# Patient Record
Sex: MALE | Race: WHITE | NOT HISPANIC OR LATINO | Employment: FULL TIME | URBAN - METROPOLITAN AREA
[De-identification: names, ages, dates, MRNs, and addresses within clinical notes are randomized per-mention and may not be internally consistent; named-entity substitution may affect disease eponyms.]

---

## 2019-12-13 DIAGNOSIS — M25.552 LEFT HIP PAIN: Primary | ICD-10-CM

## 2019-12-19 ENCOUNTER — TELEPHONE (OUTPATIENT)
Dept: ORTHOPEDICS | Facility: CLINIC | Age: 34
End: 2019-12-19

## 2019-12-19 NOTE — TELEPHONE ENCOUNTER
----- Message from Denys Storm sent at 12/19/2019  9:42 AM CST -----  Contact: [phone] / self  Patient would like a call back from a nurse for clarity on an appointment scheduled for tomorrow. Please Advise.

## 2019-12-19 NOTE — TELEPHONE ENCOUNTER
Returned call to pt.  Pt was unaware that this appointment had been scheduled.  Pt decided to cxl appointment w/ orthopedics at this time.  Pt will call back at a later time to reschedule orthopedic appointment

## 2020-01-08 ENCOUNTER — CLINICAL SUPPORT (OUTPATIENT)
Dept: REHABILITATION | Facility: OTHER | Age: 35
End: 2020-01-08
Payer: COMMERCIAL

## 2020-01-08 DIAGNOSIS — R29.898 WEAKNESS OF LEFT HIP: ICD-10-CM

## 2020-01-08 DIAGNOSIS — R29.3 POOR POSTURE: ICD-10-CM

## 2020-01-08 DIAGNOSIS — G89.29 CHRONIC LEFT HIP PAIN: ICD-10-CM

## 2020-01-08 DIAGNOSIS — M25.552 CHRONIC LEFT HIP PAIN: ICD-10-CM

## 2020-01-08 PROCEDURE — 97161 PT EVAL LOW COMPLEX 20 MIN: CPT | Mod: PN

## 2020-01-08 PROCEDURE — 97110 THERAPEUTIC EXERCISES: CPT | Mod: PN

## 2020-01-08 NOTE — PATIENT INSTRUCTIONS
PIRIFORMIS STRETCH        While lying on your back with both knee bent, cross your affected leg on the other knee.     Next, hold your unaffected thigh and pull it up towards your chest until a stretch is felt in the buttock.    Hold for 30 seconds. Perform 3 reps each LE, 1-2 times a day.    Copyright © 2285-6491 [employer]    HIP FLEXOR / QUAD STRETCH WITH STRAP - ROCKY STRETCH        Place a strap or belt around your foot as shown. Bring the other end of the belt around your shoulder. If using a belt, you may need to link 2 belts together for extra length.     While lying on a table or high bed, let the affected leg lower towards the floor. Next, gently pull on the strap to get your knee to bend until you feel a stretch on top of your thigh.      Hold for 30 seconds. Perform 3 reps each LE, 1-2 times a day.    Copyright © 2207-4802 [employer]    ILIOTIBIAL BAND STRETCH WITH BELT - ITB          Loop a belt around your foot. While lying on your back and leg up in front of you and knee straight, bring your leg across midline for a gentle stretch felt along your outer thigh.    Hold for 30 seconds. Perform 3 reps each LE, 1-2 times a day.    Copyright © 1941-9992 [employer]    HIP ABDUCTION - SIDELYING          While lying on your side, slowly raise up your top leg to the side. Keep your knee straight and maintain your toes pointed forward the entire time. Keep your leg in-line with your body.    The bottom leg can be bent to stabilize your body.    Hold for 30 seconds. Perform 2 sets of 10 reps, 1-2 times a day.    Copyright © 1005-3710 [employer]    SI joint Muscle energy for L Posterior/R anterior rotation              Bring both knees up towards your chest as shown in the picture.  Place your Left hand on top of your Left thigh, and your Right hand on the back of your Right thigh.  Push your legs into each hand with about 50% effort.      Perform 3 reps and hold for 3 seconds. Perform twice a  day.    Copyright © 4653-4164 [employer]

## 2020-01-09 NOTE — PLAN OF CARE
[facility]  Physical Therapy Initial Evaluation    Name: Nils Patino  Clinic Number: [id number]    Therapy Diagnosis:   Encounter Diagnoses   Name Primary?    Poor posture     Chronic left hip pain     Weakness of left hip      Physician: Carolynn Quarles FNP    Physician Orders: PT Eval and Treat   Medical Diagnosis: M25.552 (ICD-10-CM) - Left hip pain  Evaluation Date: 1/8/2020  Authorization Period Expiration: 12/20/2020  Plan of Care Certification Period: 3/4/2020  Visit # / Visits authorized: 1/ 1    Time In: 5:00 PM  Time Out: 5:44 PM  Total Billable Time: 44 minutes    Precautions: Standard    Subjective   Date of onset: 3 years  History of current condition - Nils is a 35 yo M referred to OP PT secondary L hip pain. Patient has had L hip pain for 3 years. L hip pain began after running 3-4 times a week. States he had PT in the past for 3 months with minimal reduction of pain. Has x-ray and MRI that suggested TEJAL. Saw Sports Medicine doctor at [place] and MRI with contrast was ordered, but denied until pt attempted OP PT again.     Past med Hx: chronic L hip pain    Current Meds: Azithromycin    Allergies: NKA    Prior Therapy: OP PT  Social History:  lives alone  Occupation: office work. In the past bartending  Prior Level of Function:I with amb and ADL  Current Level of Function: I with amb and ADL    Pain:  Current 0/10, worst 4/10, best 0/10   Location: left hip   Description: Aching and Dull  Aggravating Factors:   Easing Factors: changing position, stretching, and Ibuprofen.    Pts goals: to be able to run and walk long distances. Sleep thorough the night without pain.    Objective       Functional assessment:   - walking: I  - sit to stand: I    AROM  LE MMT  R  L    Hip flexion  5/5  5/5    Hip abduction  5/5  3/5    Hip extension  5/5  5/5    Hip ER  5/5  5/5    Hip IR  5/5  5/5    Knee extension  5/5  5/5    Knee flexion  5/5  5/5    Ankle dorsiflexion  5/5  5/5   "  Ankle plantar flexion  5/5 5/5        Flexibility testing:  - hamstrings:           B: tight, R: -35 deg, L: -35 deg  - gastrocnemius:     B: WNL  - piriformis:             R: WFL, L: tight, decreased 50%  - quadriceps:          B:WNL  - hip adductors:      B: tight, decreased 25%  - hip flexors:           B: tight, decreased 25%  - IT bands:             R: WNL, L: tight, decreased 25%    Joint Mobility          R              L  Hip ER              40 deg       50 deg  Hip IR               30 deg        30 deg    CMS Impairment/Limitation/Restriction for FOTO Hip Survey    Therapist reviewed FOTO scores for Nils Patino on 1/8/2020.   FOTO documents entered into [employer] - see Media section.    Limitation Score: 34%  Category: Mobility    Current : CJ = at least 20% but < 40% impaired, limited or restricted  Goal: CJ = at least 20% but < 40% impaired, limited or restricted       TREATMENT   Treatment Time In: 5:25 PM  Treatment Time Out: 5:44 PM  Total Treatment time separate from Evaluation time: 19 minutes    Nils received therapeutic exercises to develop strength, ROM, flexibility and posture for 19 minutes including:  Piriformis stretch x 30"  Supine hip flexor stretch  Side lying hip abd 2 x 10 x 3"  Push/pull 3 x 3"      Home Exercises Provided and Patient Education Provided     Education provided:   - Discussed the role of the PTA on the Rehab Team. Also discussed the use of the [facility] for communication.    Piriformis stretch  side lying hip abd  IT band with strap  Supine hip flexor stretch with strap  Push/pull      Written Home Exercises Provided: yes.  Exercises were reviewed and Nils was able to demonstrate them prior to the end of the session.  Nils demonstrated good  understanding of the education provided.     See EMR under Patient Instructions for exercises provided 1/8/2020.  Assessment   Nils is a 34 y.o. female referred to outpatient Physical Therapy with a medical diagnosis of " M25.552 (ICD-10-CM) - Left hip pain. Pt presents with pelvic dysfunction, L hip weakness, and decreased B LE flexibility contributing to L hip pain. Push/pull promoted correct pelvic alignment and patient able to activate L hip abductors with increased force/strength. Will benefit from OP PT for core strengthening, manual therapy, dry needling and B LE stretching to achieve below listed goals.    Pt prognosis is Good.   Pt will benefit from skilled outpatient Physical Therapy to address the deficits stated above and in the chart below, provide pt/family education, and to maximize pt's level of independence.     Plan of care discussed with patient: Yes  Pt's spiritual, cultural and educational needs considered and patient is agreeable to the plan of care and goals as stated below:     Anticipated Barriers for therapy: none    Medical Necessity is demonstrated by the following  History  Co-morbidities and personal factors that may impact the plan of care Co-morbidities:   none    Personal Factors:   no deficits     low   Examination  Body Structures and Functions, activity limitations and participation restrictions that may impact the plan of care Body Regions:   lower extremities    Body Systems:    ROM  strength  gait    Participation Restrictions:   none    Activity limitations:   Learning and applying knowledge  no deficits    General Tasks and Commands  no deficits    Communication  no deficits    Mobility  running    Self care  no deficits    Domestic Life  no deficits    Interactions/Relationships  no deficits    Life Areas  no deficits    Community and Social Life  no deficits         moderate   Clinical Presentation stable and uncomplicated low   Decision Making/ Complexity Score: low     Goals:  Short Term Goals: 4 weeks   1. Independent with HEP.  2. Report decreased L hip pain < or =  2/10 with adls such as walking, running, and sleeping on L side.  3. Increased MMT for B LE by 1 muscle grade to promote  proper pelvic stability to decrease L hip pain < or =  2/10 with adls such as walking, running, and sleeping on L side.     Long Term Goals: 8 weeks   4. Report decreased L hip pain < or =  1/10 with adls such as walking, running, and sleeping on L side.  5. Increased MMT for B LE by 1 muscle grade to promote proper pelvic stability to decrease L hip pain < or =  1/10 with adls such as walking, running, and sleeping on L side.   6. Increased flexibility in B hamstrings to -20 deg with 90/90 test to promote proper pelvic stability to decrease L hip pain < or =  1/10 with adls such as walking, running, and sleeping on L side.   7. Increased flexibility in B hip adductors, L piriformis, B hip flexors, and L IT bands to promote proper pelvic stability to decrease L hip pain < or =  1/10 with adls such as walking, running, and sleeping on L side.  8. Patient to achieve CJ (at least 20% < 40% impaired, limited or restricted) level at 23% functional limitation on the FOTO Outcomes Measurement System.    Plan   Certification Period/Plan of care expiration: 1/8/2020 to 3/4/2020.    Outpatient Physical Therapy 1 times weekly for 8 weeks to include the following interventions: Manual Therapy, Moist Heat/ Ice, Neuromuscular Re-ed, Patient Education, Therapeutic Activites, Therapeutic Exercise and dry needling.     Claudio Aponte, PT

## 2020-01-15 ENCOUNTER — CLINICAL SUPPORT (OUTPATIENT)
Dept: REHABILITATION | Facility: OTHER | Age: 35
End: 2020-01-15
Payer: COMMERCIAL

## 2020-01-15 DIAGNOSIS — M25.552 CHRONIC LEFT HIP PAIN: ICD-10-CM

## 2020-01-15 DIAGNOSIS — R29.3 POOR POSTURE: ICD-10-CM

## 2020-01-15 DIAGNOSIS — G89.29 CHRONIC LEFT HIP PAIN: ICD-10-CM

## 2020-01-15 DIAGNOSIS — R29.898 WEAKNESS OF LEFT HIP: ICD-10-CM

## 2020-01-15 PROCEDURE — 97110 THERAPEUTIC EXERCISES: CPT | Mod: PN,CQ

## 2020-01-15 PROCEDURE — 97140 MANUAL THERAPY 1/> REGIONS: CPT | Mod: PN,CQ

## 2020-01-15 NOTE — PROGRESS NOTES
"  Physical Therapy Daily Treatment Note     Name: Nils Patino  Clinic Number: [id number]    Therapy Diagnosis:   Encounter Diagnoses   Name Primary?    Poor posture     Chronic left hip pain     Weakness of left hip      Physician: Carolynn Quarles FNP    Visit Date: 1/15/2020    Physician Orders: PT Eval and Treat   Medical Diagnosis: M25.552 (ICD-10-CM) - Left hip pain  Evaluation Date: 1/8/2020  Authorization Period Expiration: 12/20/2020  Plan of Care Certification Period: 3/4/2020  Visit # / Visits authorized: 2/ 1 (Auth pend)       Time In: 7:00 AM  Time Out: 7:58 AM  Total Billable Time: 58 minutes    Precautions: Standard       Subjective     Pt reports: reports feeling pain /tightness in his L hip mostly at night or the end of the day. States he feels that it maybe related to being on his feet.  She was compliant with home exercise program.  Response to previous treatment: Tolerated well.  Functional change: None  Prior Level of Function:I with amb and ADL  Current Level of Function: I with amb and ADL  Pain:  Current 0/10, worst 4/10, best 0/10   Location: left hip   Description: Aching and Dull  Aggravating Factors:   Easing Factors: changing position, stretching, and Ibuprofen.     Pts goals: to be able to run and walk long distances. Sleep thorough the night without pain.      Objective     Nils received therapeutic exercises to develop strength, ROM, flexibility and posture for 45 minutes including:    Piriformis stretch 3 x 30"   Supine hip flexor stretch 3x30"   Side lying hip abd 2 x 10 x 3" (B)  Push/pull 3 x 3" (B)  +SL clams w/OTB 2x10  +Micah  (B)  +SLR 3x10 (B)  +Prone hip ext (B) 2x10  +Bridging w/ball squeeze 2x10  5" hold  +Shuttle 50#  x20  + HS stretch at stairs 3x30"    Nils received the following manual therapy techniques: Joint mobilizations, Myofacial release and Soft tissue Mobilization were applied to the: Left hip  for 8 minutes, including:    +Stick to hamstrings/ ITB " (L)     Nils participated in neuromuscular re-education activities to improve: Balance, Coordination, Kinesthetic, Sense and Proprioception for 00 minutes. The following activities were included:  NP    Nils received hot pack for 00 minutes to NP.    Nils received cold pack for 00 minutes to NP.    **Taken at Initial Evaluation**  1/8/2020    AROM  LE MMT  R  L    Hip flexion  5/5  5/5    Hip abduction  5/5  3/5    Hip extension  5/5  5/5    Hip ER  5/5  5/5    Hip IR  5/5  5/5    Knee extension  5/5  5/5    Knee flexion  5/5  5/5    Ankle dorsiflexion  5/5  5/5    Ankle plantar flexion  5/5  5/5          Flexibility testing:  - hamstrings:           B: tight, R: -35 deg, L: -35 deg  - gastrocnemius:     B: WNL  - piriformis:             R: WFL, L: tight, decreased 50%  - quadriceps:          B:WNL  - hip adductors:      B: tight, decreased 25%  - hip flexors:           B: tight, decreased 25%  - IT bands:             R: WNL, L: tight, decreased 25%       Home Exercises Provided and Patient Education Provided     Education provided:   Rationale for there-ex/HEP and POC    Written Home Exercises Provided: yes.  Exercises were reviewed and Nils was able to demonstrate them prior to the end of the session.  Nils demonstrated good  understanding of the education provided.     See EMR under Patient Instructions for exercises provided 1/15/2020 and 1/8/2020.    Assessment     Pt tolerated exercise well. Muscle weakness in hip flexors/abductors was notable during there-ex. Tightness was present in the ITB and hip flexors during stretching. Pt reported feeling well and declined modalities for pain post TX.     Nils is progressing well towards her goals.   Pt prognosis is Good.       Pt will continue to benefit from skilled outpatient physical therapy to address the deficits listed in the problem list box on initial evaluation, provide pt/family education and to maximize pt's level of independence in the home and  community environment.     Pt's spiritual, cultural and educational needs considered and pt agreeable to plan of care and goals.     Anticipated Barriers for therapy: none    Goals:  Short Term Goals: 4 weeks   1. Independent with HEP.  2. Report decreased L hip pain < or =  2/10 with adls such as walking, running, and sleeping on L side.  3. Increased MMT for B LE by 1 muscle grade to promote proper pelvic stability to decrease L hip pain < or =  2/10 with adls such as walking, running, and sleeping on L side.      Long Term Goals: 8 weeks   4. Report decreased L hip pain < or =  1/10 with adls such as walking, running, and sleeping on L side.  5. Increased MMT for B LE by 1 muscle grade to promote proper pelvic stability to decrease L hip pain < or =  1/10 with adls such as walking, running, and sleeping on L side.   6. Increased flexibility in B hamstrings to -20 deg with 90/90 test to promote proper pelvic stability to decrease L hip pain < or =  1/10 with adls such as walking, running, and sleeping on L side.   7. Increased flexibility in B hip adductors, L piriformis, B hip flexors, and L IT bands to promote proper pelvic stability to decrease L hip pain < or =  1/10 with adls such as walking, running, and sleeping on L side.  8. Patient to achieve CJ (at least 20% < 40% impaired, limited or restricted) level at 23% functional limitation on the FOTO Outcomes Measurement System.      Plan     Certification Period/Plan of care expiration: 1/8/2020 to 3/4/2020.    Continue with current POC for further strengthening, improve flexibility, ROM and ADL function.      Outpatient Physical Therapy 1 times weekly for 8 weeks to include the following interventions: Manual Therapy, Moist Heat/ Ice, Neuromuscular Re-ed, Patient Education, Therapeutic Activites, Therapeutic Exercise and dry needling.       Baldomero Strauss, PTA

## 2020-01-22 ENCOUNTER — CLINICAL SUPPORT (OUTPATIENT)
Dept: REHABILITATION | Facility: OTHER | Age: 35
End: 2020-01-22
Payer: COMMERCIAL

## 2020-01-22 DIAGNOSIS — R29.898 WEAKNESS OF LEFT HIP: ICD-10-CM

## 2020-01-22 DIAGNOSIS — G89.29 CHRONIC LEFT HIP PAIN: ICD-10-CM

## 2020-01-22 DIAGNOSIS — M25.552 CHRONIC LEFT HIP PAIN: ICD-10-CM

## 2020-01-22 DIAGNOSIS — R29.3 POOR POSTURE: ICD-10-CM

## 2020-01-22 PROCEDURE — 97140 MANUAL THERAPY 1/> REGIONS: CPT | Mod: PN,CQ

## 2020-01-22 PROCEDURE — 97110 THERAPEUTIC EXERCISES: CPT | Mod: PN,CQ

## 2020-01-22 NOTE — PROGRESS NOTES
"  Physical Therapy Daily Treatment Note     Name: Nils Patino  Clinic Number: [id number]    Therapy Diagnosis:   Encounter Diagnoses   Name Primary?    Poor posture     Chronic left hip pain     Weakness of left hip      Physician: Carolynn Quarles FNP    Visit Date: 1/22/2020    Physician Orders: PT Eval and Treat   Medical Diagnosis: M25.552 (ICD-10-CM) - Left hip pain  Evaluation Date: 1/8/2020  Authorization Period Expiration: 12/20/2020  Plan of Care Certification Period: 3/4/2020  Visit # / Visits authorized: 3/ 1 (Auth pend)       Time In: 7:00 AM  Time Out: 7:56 AM  Total Billable Time: 56 minutes    Precautions: Standard       Subjective     Pt reports: reports feeling tightness in his L hip mostly at night or when he lies on it. States he is not having any pain today.  She was compliant with home exercise program.  Response to previous treatment: Tolerated well.  Functional change: None  Prior Level of Function:I with amb and ADL  Current Level of Function: I with amb and ADL  Pain:  Current 0/10, worst 4/10, best 0/10   Location: left hip   Description: Aching and Dull  Aggravating Factors:   Easing Factors: changing position, stretching, and Ibuprofen.     Pts goals: to be able to run and walk long distances. Sleep thorough the night without pain.      Objective     Nils received therapeutic exercises to develop strength, ROM, flexibility and posture for 45 minutes including:    Piriformis stretch 3 x 30"   Supine hip flexor stretch 3x30"   Side lying hip abd 2 x 10 x 3" (B)  Push/pull 3 x 3" (B)  ITB stretch with strap  3x30"  +SL clams w/OTB 2x10  +Micah  (B)  +SLR 3x10 (B)  +Prone hip ext (B) 2x10  +Bridging w/ball squeeze 2x10  5" hold  +Shuttle 50#  x20  + HS stretch at stairs 3x30"    Nils received the following manual therapy techniques: Joint mobilizations, Myofacial release and Soft tissue Mobilization were applied to the: Left hip  for 8 minutes, including:    +Stick to hamstrings/ " ITB (L)       Nils participated in neuromuscular re-education activities to improve: Balance, Coordination, Kinesthetic, Sense and Proprioception for 00 minutes. The following activities were included:  NP    Nils received hot pack for 00 minutes to NP.    Nils received cold pack for 00 minutes to NP.    **Taken at Initial Evaluation**  1/8/2020    AROM  LE MMT  R  L    Hip flexion  5/5  5/5    Hip abduction  5/5  3/5    Hip extension  5/5  5/5    Hip ER  5/5  5/5    Hip IR  5/5  5/5    Knee extension  5/5  5/5    Knee flexion  5/5  5/5    Ankle dorsiflexion  5/5  5/5    Ankle plantar flexion  5/5  5/5          Flexibility testing:  - hamstrings:           B: tight, R: -35 deg, L: -35 deg  - gastrocnemius:     B: WNL  - piriformis:             R: WFL, L: tight, decreased 50%  - quadriceps:          B:WNL  - hip adductors:      B: tight, decreased 25%  - hip flexors:           B: tight, decreased 25%  - IT bands:             R: WNL, L: tight, decreased 25%       Home Exercises Provided and Patient Education Provided     Education provided:   Rationale for there-ex/HEP and POC    Written Home Exercises Provided: yes.  Exercises were reviewed and Nils was able to demonstrate them prior to the end of the session.  Nils demonstrated good  understanding of the education provided.     See EMR under Patient Instructions for exercises provided 1/15/2020 and 1/8/2020.    Assessment     Pt tolerated exercise well. Muscle weakness in hip flexors/abductors was notable during there-ex. Tightness continues to remain present in the ITB/ hip flexors during stretching. Pt reported feeling well and declined modalities for pain post TX.     Nils is progressing well towards her goals.   Pt prognosis is Good.       Pt will continue to benefit from skilled outpatient physical therapy to address the deficits listed in the problem list box on initial evaluation, provide pt/family education and to maximize pt's level of independence in  the home and community environment.     Pt's spiritual, cultural and educational needs considered and pt agreeable to plan of care and goals.     Anticipated Barriers for therapy: none    Goals:  Short Term Goals: 4 weeks   1. Independent with HEP.  2. Report decreased L hip pain < or =  2/10 with adls such as walking, running, and sleeping on L side.  3. Increased MMT for B LE by 1 muscle grade to promote proper pelvic stability to decrease L hip pain < or =  2/10 with adls such as walking, running, and sleeping on L side.      Long Term Goals: 8 weeks   4. Report decreased L hip pain < or =  1/10 with adls such as walking, running, and sleeping on L side.  5. Increased MMT for B LE by 1 muscle grade to promote proper pelvic stability to decrease L hip pain < or =  1/10 with adls such as walking, running, and sleeping on L side.   6. Increased flexibility in B hamstrings to -20 deg with 90/90 test to promote proper pelvic stability to decrease L hip pain < or =  1/10 with adls such as walking, running, and sleeping on L side.   7. Increased flexibility in B hip adductors, L piriformis, B hip flexors, and L IT bands to promote proper pelvic stability to decrease L hip pain < or =  1/10 with adls such as walking, running, and sleeping on L side.  8. Patient to achieve CJ (at least 20% < 40% impaired, limited or restricted) level at 23% functional limitation on the FOTO Outcomes Measurement System.      Plan     Certification Period/Plan of care expiration: 1/8/2020 to 3/4/2020.    Continue with current POC for further strengthening, improve flexibility, ROM and ADL function.      Outpatient Physical Therapy 1 times weekly for 8 weeks to include the following interventions: Manual Therapy, Moist Heat/ Ice, Neuromuscular Re-ed, Patient Education, Therapeutic Activites, Therapeutic Exercise and dry needling.       Baldomero Strauss, PTA

## 2020-01-30 ENCOUNTER — CLINICAL SUPPORT (OUTPATIENT)
Dept: REHABILITATION | Facility: OTHER | Age: 35
End: 2020-01-30
Payer: COMMERCIAL

## 2020-01-30 DIAGNOSIS — M25.552 CHRONIC LEFT HIP PAIN: ICD-10-CM

## 2020-01-30 DIAGNOSIS — R29.898 WEAKNESS OF LEFT HIP: ICD-10-CM

## 2020-01-30 DIAGNOSIS — R29.3 POOR POSTURE: ICD-10-CM

## 2020-01-30 DIAGNOSIS — G89.29 CHRONIC LEFT HIP PAIN: ICD-10-CM

## 2020-01-30 PROCEDURE — 97112 NEUROMUSCULAR REEDUCATION: CPT | Mod: PN

## 2020-01-30 PROCEDURE — 97110 THERAPEUTIC EXERCISES: CPT | Mod: PN

## 2020-01-30 NOTE — PROGRESS NOTES
"  Physical Therapy Daily Treatment Note     Name: Nils Patino  Clinic Number: [id number]    Therapy Diagnosis:   Encounter Diagnoses   Name Primary?    Poor posture     Chronic left hip pain     Weakness of left hip      Physician: Carolynn Quarles FNP    Visit Date: 1/30/2020    Physician Orders: PT Eval and Treat   Medical Diagnosis: M25.552 (ICD-10-CM) - Left hip pain  Evaluation Date: 1/8/2020  Authorization Period Expiration: 12/20/2020  Plan of Care Certification Period: 3/4/2020  Visit # / Visits authorized: 3/ 1 (Auth pend)       Time In: 7:05 AM  Time Out: 7:45 AM  Total Billable Time: 40 minutes    Precautions: Standard       Subjective     Pt reports: his hip is feeling okay. States his R LE feels more stable on his R LE when he single leg stands. Also reporting R upper thigh, gluteal, and calf pain. States he will sometime wake up with increased L hip pain. Unsure what may cause it some days and not other days.    She was compliant with home exercise program.  Response to previous treatment: Tolerated well.  Functional change: None    Pain:  Current 0/10  Location: left hip         Objective     Nils received therapeutic exercises to develop strength, ROM, flexibility and posture for 25 minutes including:    Piriformis stretch 3 x 30"   Bridging w/ball squeeze 20 x 5"   Push/pull 3 x 3"   +abd walk with green band 2 x 40 ft  +monster walk (forward) with green band 2 x 40 ft  +monster walk (reverse) with green band 2 x 40 ft    Supine hip flexor stretch 3 x 30"   Side lying hip abd 2 x 10 x 3" (B)  IT Band stretch with strap  3 x 30"  SL clams w/OTB 2x10  [place]  (B)  SLR 3x10 (B)  Prone hip ext (B) 2x10  Shuttle 50#  x20  HS stretch at stairs 3 x 30"    Nils received the following manual therapy techniques: Joint mobilizations, Myofacial release and Soft tissue Mobilization were applied to the: Left hip for 0 minutes, including:    Stick to hamstrings/ ITB (L)       Nils participated in " "neuromuscular re-education activities to improve: Balance, Coordination, Kinesthetic, Sense and Proprioception for 15 minutes. The following activities were included:  +R SLS with L rhythmic stabilization hip flex, abd, add, ext x 30" each, with green band  +SLS 3 x 30"  +SLS on foam 3 x 30"  + tandem stance 3 x 30"        Home Exercises Provided and Patient Education Provided     Education provided:   Stressed importance of push/pull    Written Home Exercises Provided: no new HEP added today.  Exercises were reviewed and Nils was able to demonstrate them prior to the end of the session.  Nils demonstrated good  understanding of the education provided.     See EMR under Patient Instructions for exercises provided 1/15/2020 and 1/8/2020.    Assessment     Progressed to balance and coordination exercises today. Will progress with single leg balance and stabilization exercises to increase L hip strength and stability.    Nils is progressing well towards her goals.   Pt prognosis is Good.       Pt will continue to benefit from skilled outpatient physical therapy to address the deficits listed in the problem list box on initial evaluation, provide pt/family education and to maximize pt's level of independence in the home and community environment.     Pt's spiritual, cultural and educational needs considered and pt agreeable to plan of care and goals.     Anticipated Barriers for therapy: none    Goals:  Short Term Goals: 4 weeks   1. Independent with HEP. (ongoing)  2. Report decreased L hip pain < or =  2/10 with adls such as walking, running, and sleeping on L side. (in progress)  3. Increased MMT for B LE by 1 muscle grade to promote proper pelvic stability to decrease L hip pain < or =  2/10 with adls such as walking, running, and sleeping on L side. (in progress)     Long Term Goals: 8 weeks   4. Report decreased L hip pain < or =  1/10 with adls such as walking, running, and sleeping on L side. (in " progress)  5. Increased MMT for B LE by 1 muscle grade to promote proper pelvic stability to decrease L hip pain < or =  1/10 with adls such as walking, running, and sleeping on L side. (in progress)  6. Increased flexibility in B hamstrings to -20 deg with 90/90 test to promote proper pelvic stability to decrease L hip pain < or =  1/10 with adls such as walking, running, and sleeping on L side. (in progress)  7. Increased flexibility in B hip adductors, L piriformis, B hip flexors, and L IT bands to promote proper pelvic stability to decrease L hip pain < or =  1/10 with adls such as walking, running, and sleeping on L side.(in progress)  8. Patient to achieve CJ (at least 20% < 40% impaired, limited or restricted) level at 23% functional limitation on the FOTO Outcomes Measurement System.(in progress)      Plan     Certification Period/Plan of care expiration: 1/8/2020 to 3/4/2020.    Continue with current POC for further strengthening, improve flexibility, ROM and ADL function.      Outpatient Physical Therapy 1 times weekly for 8 weeks to include the following interventions: Manual Therapy, Moist Heat/ Ice, Neuromuscular Re-ed, Patient Education, Therapeutic Activites, Therapeutic Exercise and dry needling.       Claudio Aponte, PT

## 2020-02-05 ENCOUNTER — CLINICAL SUPPORT (OUTPATIENT)
Dept: REHABILITATION | Facility: OTHER | Age: 35
End: 2020-02-05
Payer: COMMERCIAL

## 2020-02-05 DIAGNOSIS — R29.898 WEAKNESS OF LEFT HIP: ICD-10-CM

## 2020-02-05 DIAGNOSIS — G89.29 CHRONIC LEFT HIP PAIN: ICD-10-CM

## 2020-02-05 DIAGNOSIS — R29.3 POOR POSTURE: ICD-10-CM

## 2020-02-05 DIAGNOSIS — M25.552 CHRONIC LEFT HIP PAIN: ICD-10-CM

## 2020-02-05 PROCEDURE — 97112 NEUROMUSCULAR REEDUCATION: CPT | Mod: PN,CQ

## 2020-02-05 PROCEDURE — 97110 THERAPEUTIC EXERCISES: CPT | Mod: PN,CQ

## 2020-02-05 NOTE — PROGRESS NOTES
"  Physical Therapy Daily Treatment Note     Name: Nils Patino  Clinic Number: [id number]    Therapy Diagnosis:   Encounter Diagnoses   Name Primary?    Poor posture     Chronic left hip pain     Weakness of left hip      Physician: Carolynn Quarles FNP    Visit Date: 2/5/2020    Physician Orders: PT Eval and Treat   Medical Diagnosis: M25.552 (ICD-10-CM) - Left hip pain  Evaluation Date: 1/8/2020  Authorization Period Expiration: 12/20/2020  Plan of Care Certification Period: 3/4/2020  Visit # / Visits authorized: 4/ 1 (Auth pend)       Time In: 7:00 AM  Time Out: 8:00 AM  Total Billable Time: 60  minutes    Precautions: Standard       Subjective     Pt reports: feeling well today. States he feels like his hips are getting better. States has has noticed improvement with the stiffness/hip pain in the AM.     She was compliant with home exercise program.  Response to previous treatment: Tolerated well.  Functional change: None    Pain:  Current 0/10  Location: left hip         Objective     Nils received therapeutic exercises to develop strength, ROM, flexibility and posture for 45 minutes including:    Piriformis stretch 3 x 30"   Bridging w/ball squeeze 20 x 5"   Push/pull 3 x 3"   +abd walk with green band 2 x 40 ft  +monster walk (forward) with green band 2 x 40 ft  +monster walk (reverse) with green band 2 x 40 ft    Supine hip flexor stretch 3 x 30"   Side lying hip abd 2 x 10 x 3" (B)   IT Band stretch with strap  3 x 30"  SL clams w/GTB 2x10  [place]  (B)  SLR 3x10 (B) 2#  Prone hip ext (B) 2x10  Shuttle 50#  x20  HS stretch at stairs 3 x 30"    Nils received the following manual therapy techniques: Joint mobilizations, Myofacial release and Soft tissue Mobilization were applied to the: Left hip for 0 minutes, including:    Stick to hamstrings/ ITB (L)       Nils participated in neuromuscular re-education activities to improve: Balance, Coordination, Kinesthetic, Sense and Proprioception for 15 " "minutes. The following activities were included:    +R SLS with L rhythmic stabilization hip flex, abd, add, ext x 30" each, with green band  +SLS 3 x 30"  +SLS on foam 3 x 30"  + tandem stance 3 x 30"        Home Exercises Provided and Patient Education Provided     Education provided:   Stressed importance of push/pull    Written Home Exercises Provided: no new HEP added today.  Exercises were reviewed and Nils was able to demonstrate them prior to the end of the session.  Nils demonstrated good  understanding of the education provided.     See EMR under Patient Instructions for exercises provided 1/15/2020 and 1/8/2020.    Assessment     Pt tolerated exercise well. Pt was recently progressed to dynamic/hip/knee strengthening exercises with good tolerance noted. Flexibility appears to be improving in the ITB/hip flexors.     Nils is progressing well towards her goals.   Pt prognosis is Good.       Pt will continue to benefit from skilled outpatient physical therapy to address the deficits listed in the problem list box on initial evaluation, provide pt/family education and to maximize pt's level of independence in the home and community environment.     Pt's spiritual, cultural and educational needs considered and pt agreeable to plan of care and goals.     Anticipated Barriers for therapy: none    Goals:  Short Term Goals: 4 weeks   1. Independent with HEP. (ongoing)  2. Report decreased L hip pain < or =  2/10 with adls such as walking, running, and sleeping on L side. (in progress)  3. Increased MMT for B LE by 1 muscle grade to promote proper pelvic stability to decrease L hip pain < or =  2/10 with adls such as walking, running, and sleeping on L side. (in progress)     Long Term Goals: 8 weeks   4. Report decreased L hip pain < or =  1/10 with adls such as walking, running, and sleeping on L side. (in progress)  5. Increased MMT for B LE by 1 muscle grade to promote proper pelvic stability to decrease L " hip pain < or =  1/10 with adls such as walking, running, and sleeping on L side. (in progress)  6. Increased flexibility in B hamstrings to -20 deg with 90/90 test to promote proper pelvic stability to decrease L hip pain < or =  1/10 with adls such as walking, running, and sleeping on L side. (in progress)  7. Increased flexibility in B hip adductors, L piriformis, B hip flexors, and L IT bands to promote proper pelvic stability to decrease L hip pain < or =  1/10 with adls such as walking, running, and sleeping on L side.(in progress)  8. Patient to achieve CJ (at least 20% < 40% impaired, limited or restricted) level at 23% functional limitation on the FOTO Outcomes Measurement System.(in progress)      Plan     Certification Period/Plan of care expiration: 1/8/2020 to 3/4/2020.    Continue with current POC for further strengthening, improve flexibility, ROM and ADL function.      Outpatient Physical Therapy 1 times weekly for 8 weeks to include the following interventions: Manual Therapy, Moist Heat/ Ice, Neuromuscular Re-ed, Patient Education, Therapeutic Activites, Therapeutic Exercise and dry needling.       Baldomero Strauss, PTA

## 2022-11-02 ENCOUNTER — OFFICE VISIT (OUTPATIENT)
Dept: UROLOGY | Facility: CLINIC | Age: 37
End: 2022-11-02
Payer: COMMERCIAL

## 2022-11-02 VITALS — SYSTOLIC BLOOD PRESSURE: 129 MMHG | HEART RATE: 61 BPM | DIASTOLIC BLOOD PRESSURE: 76 MMHG

## 2022-11-02 DIAGNOSIS — I86.1 BILATERAL VARICOCELES: Primary | ICD-10-CM

## 2022-11-02 PROCEDURE — 3074F PR MOST RECENT SYSTOLIC BLOOD PRESSURE < 130 MM HG: ICD-10-PCS | Mod: CPTII,S$GLB,, | Performed by: NURSE PRACTITIONER

## 2022-11-02 PROCEDURE — 99203 PR OFFICE/OUTPT VISIT, NEW, LEVL III, 30-44 MIN: ICD-10-PCS | Mod: S$GLB,,, | Performed by: NURSE PRACTITIONER

## 2022-11-02 PROCEDURE — 3078F PR MOST RECENT DIASTOLIC BLOOD PRESSURE < 80 MM HG: ICD-10-PCS | Mod: CPTII,S$GLB,, | Performed by: NURSE PRACTITIONER

## 2022-11-02 PROCEDURE — 1159F MED LIST DOCD IN RCRD: CPT | Mod: CPTII,S$GLB,, | Performed by: NURSE PRACTITIONER

## 2022-11-02 PROCEDURE — 99203 OFFICE O/P NEW LOW 30 MIN: CPT | Mod: S$GLB,,, | Performed by: NURSE PRACTITIONER

## 2022-11-02 PROCEDURE — 1159F PR MEDICATION LIST DOCUMENTED IN MEDICAL RECORD: ICD-10-PCS | Mod: CPTII,S$GLB,, | Performed by: NURSE PRACTITIONER

## 2022-11-02 PROCEDURE — 3074F SYST BP LT 130 MM HG: CPT | Mod: CPTII,S$GLB,, | Performed by: NURSE PRACTITIONER

## 2022-11-02 PROCEDURE — 3078F DIAST BP <80 MM HG: CPT | Mod: CPTII,S$GLB,, | Performed by: NURSE PRACTITIONER

## 2022-11-02 RX ORDER — IBUPROFEN 200 MG
200 TABLET ORAL EVERY 6 HOURS PRN
COMMUNITY

## 2022-11-02 NOTE — PROGRESS NOTES
Subjective:      Nils Patino is a 37 y.o. male who was referred by Estefania Penaloza NP for evaluation of his varicoceles.    The patient developed right testicular pain this summer which prompted the completion of a scrotal US. Pain has completely resolved with changing to a new underwear. Denies bothersome urinary symptoms and penile discharge.         The following portions of the patient's history were reviewed and updated as appropriate: allergies, current medications, past family history, past medical history, past social history, past surgical history and problem list.    Review of Systems  Constitutional: no fever or chills  ENT: no nasal congestion or sore throat  Respiratory: no cough or shortness of breath  Cardiovascular: no chest pain or palpitations  Gastrointestinal: no nausea or vomiting, tolerating diet  Genitourinary: as per HPI  Hematologic/Lymphatic: no easy bruising or lymphadenopathy  Musculoskeletal: no arthralgias or myalgias  Neurological: no seizures or tremors  Behavioral/Psych: no auditory or visual hallucinations     Objective:   Vitals: /76   Pulse 61     Physical Exam   General: alert and oriented, no acute distress  Head: normocephalic, atraumatic  Neck: supple, normal ROM  Respiratory: Symmetric expansion, non-labored breathing  Cardiovascular: regular rate and rhythm  Abdomen: soft, non tender, non distended  Genitourinary:   Penis: normal, no lesions, patent orthotopic meatus, no plaques  Scrotum: no rashes or skin changes;   Testes: descended bilaterally, no masses, nontender, normal epididymides bilaterally, no hydroceles\  Skin: normal coloration and turgor, no rashes, no suspicious skin lesions noted  Neuro: alert and oriented x3, no gross deficits  Psych: normal judgment and insight, normal mood/affect, and non-anxious    Lab Review   Urinalysis demonstrates negative for all components  No results found for: WBC, HGB, HCT, MCV, PLT  No results found for: CREATININE,  BUN  No results found for: PSA  Imaging   None    Assessment:     1. Bilateral varicoceles      Plan:   Nils was seen today for varicoceles.    Diagnoses and all orders for this visit:    Bilateral varicoceles  -     US Abdomen Complete; Future    Plan:  --Discussed US findings  --Abdominal US for further eval- r/o abdominal mass   --Pt requests SA- order faxed to [facility]   --Will notify with results

## 2022-11-04 ENCOUNTER — CLINICAL SUPPORT (OUTPATIENT)
Dept: REHABILITATION | Facility: OTHER | Age: 37
End: 2022-11-04
Payer: COMMERCIAL

## 2022-11-04 DIAGNOSIS — R29.898 IMPAIRED FLEXIBILITY OF LOWER EXTREMITY: ICD-10-CM

## 2022-11-04 DIAGNOSIS — M25.551 RIGHT HIP PAIN: Primary | ICD-10-CM

## 2022-11-04 PROCEDURE — 97110 THERAPEUTIC EXERCISES: CPT | Mod: PN

## 2022-11-04 PROCEDURE — 97161 PT EVAL LOW COMPLEX 20 MIN: CPT | Mod: PN

## 2022-11-04 PROCEDURE — 97112 NEUROMUSCULAR REEDUCATION: CPT | Mod: PN

## 2022-11-04 NOTE — PROGRESS NOTES
[facility]   Physical Therapy Initial Evaluation     Date: 11/4/2022   Name: Nils Patino  Clinic Number: [id number]    Therapy Diagnosis: No diagnosis found.  Physician: Estefania Penaloza*    Physician Orders: PT Eval and Treat   Medical Diagnosis from Referral:   M25.551 (ICD-10-CM) - Right hip pain   M79.606 (ICD-10-CM) - Leg pain     Evaluation Date: 11/4/2022  Authorization Period Expiration: 12/31/22   Plan of Care Expiration: 12/16/2022  Progress Note Due: 12/4/2022  Visit # / Visits authorized: 1/ 1   FOTO: 1/3 (11/4/2022)    Precautions: Standard     Time In: 1:45  Time Out: 2:40  Total Appointment Time (timed & untimed codes): 50 minutes      SUBJECTIVE     Date of onset: 9/4/2022    History of current condition - NILS reports: Pt reports he was sitting on the couch and he got sharp LB pain R>L. Pt thinks it is related to doing deadlifts the day before but the pain didn't get better and he started to get N/T down his leg into his heel. Pt got a standing desk since the injury because of the pain with his back.    Falls: None    Imaging: None    Prior Therapy: None for LB/R hip  Occupation: Software, works on the computer  Prior Level of Function: Running 4 times a few times a week, working ot 2-3 x a week  Current Level of Function: Not running, has been taking time off of resistance exercises, limited with standing and walking tolerance    Pain:  Current 2/10, worst 6/10, best 1/10   Location: right posterior leg numbness/tingling into heel, LB pain  Description: Aching, Tight, Tingling, and Electric  Aggravating Factors: Sitting for extended periods, posterior pelvic tilt, driving, walking   Easing Factors: Hanging from a bar, lumbar extensions, anterior pelvic tilt in sitting    Patients goals: Sitting for long periods of time, getting back to running, exercising      Medical History:   Past Medical History:   Diagnosis Date    Bulging lumbar disc     COVID 2021        Surgical History:   Nils Patino  has no past surgical history on file.    Medications:   Nils has a current medication list which includes the following prescription(s): ibuprofen.    Allergies:   Review of patient's allergies indicates:  No Known Allergies       OBJECTIVE     Observation: Sitting with anterior pelvic tilt  due to pain in posterior pelvic tilt    Lumbar Range of Motion:    Limitations Pain   Flexion Mod, significant HS tension   Yes        Extension Min   No        Left Side Bending Nil No        Right Side Bending Nil No            Lower Extremity Strength  Right LE  Left LE    Quadriceps: 5/5 Quadriceps: 5/5   Hamstrings: 5/5 Hamstrings:    Iliospoas: 5/5 Iliospoas: 5/5   Hip extension:  4+/5 Hip extension: 4+/5     PGM:    Hip ER:  4+/5 Hip ER: 4+/5   Hip IR: 4+/5 Hip IR: 4+/5   Ankle dorsiflexion: 5/5 Ankle dorsiflexion: 5/5   Ankle plantarflexion: 5/5 Ankle plantarflexion: 5/5     Sensation: Intact    Reflexes:  -Patellar (L3-L4): Normal  -Achilles (S1): Normal    Special Tests:  -SLR Test: +  R: 55 deg  L: 40 deg  -Repeated Flexion: +  -Repeated Ext: Relieved symptoms   -Slump Test: + B    SI Special Tests: NT    Joint Mobility: Good mobility L spine, no pain provocation      -Hamstring : R = Significant hypomobility ; L = Significant hypomobility      Limitation/Restriction for FOTO Survey    Therapist reviewed FOTO scores for Nils Patino on 11/4/2022.   FOTO documents entered into [employer] - see Media section.    Limitation Score: 41%         TREATMENT     Total Treatment time (time-based codes) separate from Evaluation: 24 minutes      NILS received the treatments listed below:      therapeutic exercises to develop core stabilization for 12 minutes including:  Supine TA bracing 15 x 10 sec  Quadruped TA bracing 15x5 sec  Quadruped leg extension w/ pelvic neutral      neuromuscular re-education activities to improve: neural mobility for 12 minutes. The following activities were  included:  Seated sciatic nerve glides  Education on neural mobility  Supine sciatic nerve glides          PATIENT EDUCATION AND HOME EXERCISES     Education provided:   - Neural mobility  -HEP education  -Core stabilization      Written Home Exercises Provided: yes. Exercises were reviewed and NILS was able to demonstrate them prior to the end of the session.  NILS demonstrated good understanding of the education provided. See EMR under Patient Instructions for exercises provided during therapy sessions.    ASSESSMENT     Nils is a 37 y.o. male referred to outpatient Physical Therapy with a medical diagnosis of R hip pain. Patient presents with signs and symptoms of a lumbar disc pathology with radiating symptoms down his L lower extremity and centralizes with lumbar extension. Pt demonstrates significantly reduced neural mobility bilaterally as well as high hamstring tone and decreased segmental stability.Patient prognosis is Good. Patient will benefit from skilled outpatient Physical Therapy to address the deficits stated above and in the chart below, provide patient /family education, and to maximize patientt's level of independence.     Plan of care discussed with patient: Yes  Patient's spiritual, cultural and educational needs considered and patient is agreeable to the plan of care and goals as stated below:     Anticipated Barriers for therapy: None    Medical Necessity is demonstrated by the following  History  Co-morbidities and personal factors that may impact the plan of care Co-morbidities:   None    Personal Factors:   no deficits     low   Examination  Body Structures and Functions, activity limitations and participation restrictions that may impact the plan of care Body Regions:   back, right lower extremity    Body Systems:    ROM  strength    Participation Restrictions:   Prolonged sitting at work, walking for community ambulation    Activity limitations:   Learning and applying knowledge  no  deficits    General Tasks and Commands  no deficits    Communication  no deficits    Mobility  no deficits    Self care  no deficits    Domestic Life  no deficits    Interactions/Relationships  no deficits    Life Areas  no deficits    Community and Social Life  no deficits         low   Clinical Presentation stable and uncomplicated low   Decision Making/ Complexity Score: low     Goals:  Short Term Goals: 3 weeks   Pt will improve his SLR measurement 15 degrees bilaterally.  Pt will be able to maintain a neutral pelvis with bird dogs sets.  Pt will be independent with his HEP.    Long Term Goals: 6 weeks   Pt will be able to sit for <1 hr without an increase in symptoms to tolerate work.  Pt will be able to increase his SLR measurement 25 degrees from IE to improve functional mobility with his workouts.  Pt will improve his functional limitation score to 20% for return to PLOF.    PLAN   Plan of care Certification: 11/4/2022 to 12/16/2022.    Outpatient Physical Therapy 2 times weekly for 6 weeks to include the following interventions: Manual Therapy, Neuromuscular Re-ed, Patient Education, Therapeutic Activities, and Therapeutic Exercise.     Madison Arreola, PT      I CERTIFY THE NEED FOR THESE SERVICES FURNISHED UNDER THIS PLAN OF TREATMENT AND WHILE UNDER MY CARE   Physician's comments:     Physician's Signature: ___________________________________________________

## 2022-11-04 NOTE — PROGRESS NOTES
[facility]   Physical Therapy Initial Evaluation     Date: 11/4/2022   Name: Nils Patino  Clinic Number: [id number]    Therapy Diagnosis: No diagnosis found.  Physician: Estefania Penaloza*    Physician Orders: PT Eval and Treat   Medical Diagnosis from Referral:   M25.551 (ICD-10-CM) - Right hip pain   M79.606 (ICD-10-CM) - Leg pain     Evaluation Date: 11/4/2022  Authorization Period Expiration: 12/31/22   Plan of Care Expiration: 12/16/2022  Progress Note Due: 12/4/2022  Visit # / Visits authorized: 1/ 1   FOTO: 1/3 (11/4/2022)    Precautions: Standard     Time In: 1:45  Time Out: 2:40  Total Appointment Time (timed & untimed codes): 50 minutes      SUBJECTIVE     Date of onset: 9/4/2022    History of current condition - NILS reports: Pt reports he was sitting on the couch and he got sharp LB pain R>L. Pt thinks it is related to doing deadlifts the day before but the pain didn't get better and he started to get N/T down his leg into his heel. Pt got a standing desk since the injury because of the pain with his back.    Falls: None    Imaging: None    Prior Therapy: None for LB/R hip  Occupation: Software, works on the computer  Prior Level of Function: Running 4 times a few times a week, working ot 2-3 x a week  Current Level of Function: Not running, has been taking time off of resistance exercises, limited with standing and walking tolerance    Pain:  Current 2/10, worst 6/10, best 1/10   Location: right posterior leg numbness/tingling into heel, LB pain  Description: Aching, Tight, Tingling, and Electric  Aggravating Factors: Sitting for extended periods, posterior pelvic tilt, driving, walking   Easing Factors: Hanging from a bar, lumbar extensions, anterior pelvic tilt in sitting    Patients goals: Sitting for long periods of time, getting back to running, exercising      Medical History:   Past Medical History:   Diagnosis Date    Bulging lumbar disc     COVID 2021        Surgical History:   Nils Patino  has no past surgical history on file.    Medications:   Nils has a current medication list which includes the following prescription(s): ibuprofen.    Allergies:   Review of patient's allergies indicates:  No Known Allergies       OBJECTIVE     Observation: Sitting with anterior pelvic tilt  due to pain in posterior pelvic tilt    Lumbar Range of Motion:    Limitations Pain   Flexion Mod, significant HS tension   Yes        Extension Min   No        Left Side Bending Nil No        Right Side Bending Nil No            Lower Extremity Strength  Right LE  Left LE    Quadriceps: 5/5 Quadriceps: 5/5   Hamstrings: 5/5 Hamstrings:    Iliospoas: 5/5 Iliospoas: 5/5   Hip extension:  4+/5 Hip extension: 4+/5     PGM:    Hip ER:  4+/5 Hip ER: 4+/5   Hip IR: 4+/5 Hip IR: 4+/5   Ankle dorsiflexion: 5/5 Ankle dorsiflexion: 5/5   Ankle plantarflexion: 5/5 Ankle plantarflexion: 5/5     Sensation: Intact    Reflexes:  -Patellar (L3-L4): Normal  -Achilles (S1): Normal    Special Tests:  -SLR Test: +  R: 55 deg  L: 40 deg  -Repeated Flexion: +  -Repeated Ext: Relieved symptoms   -Slump Test: + B    SI Special Tests: NT    Joint Mobility: Good mobility L spine, no pain provocation      -Hamstring : R = Significant hypomobility ; L = Significant hypomobility      Limitation/Restriction for FOTO Survey    Therapist reviewed FOTO scores for Nils Patino on 11/4/2022.   FOTO documents entered into [employer] - see Media section.    Limitation Score: 41%         TREATMENT     Total Treatment time (time-based codes) separate from Evaluation: 24 minutes      NILS received the treatments listed below:      therapeutic exercises to develop core stabilization for 12 minutes including:  Supine TA bracing 15 x 10 sec  Quadruped TA bracing 15x5 sec  Quadruped leg extension w/ pelvic neutral      neuromuscular re-education activities to improve: neural mobility for 12 minutes. The following activities were  included:  Seated sciatic nerve glides  Education on neural mobility  Supine sciatic nerve glides          PATIENT EDUCATION AND HOME EXERCISES     Education provided:   - Neural mobility  -HEP education  -Core stabilization      Written Home Exercises Provided: yes. Exercises were reviewed and NILS was able to demonstrate them prior to the end of the session.  NILS demonstrated good understanding of the education provided. See EMR under Patient Instructions for exercises provided during therapy sessions.    ASSESSMENT     Nils is a 37 y.o. male referred to outpatient Physical Therapy with a medical diagnosis of R hip pain. Patient presents with signs and symptoms of a lumbar disc pathology with radiating symptoms down his L lower extremity and centralizes with lumbar extension. Pt demonstrates significantly reduced neural mobility bilaterally as well as high hamstring tone and decreased segmental stability.Patient prognosis is Good. Patient will benefit from skilled outpatient Physical Therapy to address the deficits stated above and in the chart below, provide patient /family education, and to maximize patientt's level of independence.     Plan of care discussed with patient: Yes  Patient's spiritual, cultural and educational needs considered and patient is agreeable to the plan of care and goals as stated below:     Anticipated Barriers for therapy: None    Medical Necessity is demonstrated by the following  History  Co-morbidities and personal factors that may impact the plan of care Co-morbidities:   None    Personal Factors:   no deficits     low   Examination  Body Structures and Functions, activity limitations and participation restrictions that may impact the plan of care Body Regions:   back, right lower extremity    Body Systems:    ROM  strength    Participation Restrictions:   Prolonged sitting at work, walking for community ambulation    Activity limitations:   Learning and applying knowledge  no  deficits    General Tasks and Commands  no deficits    Communication  no deficits    Mobility  no deficits    Self care  no deficits    Domestic Life  no deficits    Interactions/Relationships  no deficits    Life Areas  no deficits    Community and Social Life  no deficits         low   Clinical Presentation stable and uncomplicated low   Decision Making/ Complexity Score: low     Goals:  Short Term Goals: 3 weeks   Pt will improve his SLR measurement 15 degrees bilaterally.  Pt will be able to maintain a neutral pelvis with bird dogs sets.  Pt will be independent with his HEP.    Long Term Goals: 6 weeks   Pt will be able to sit for <1 hr without an increase in symptoms to tolerate work.  Pt will be able to increase his SLR measurement 25 degrees from IE to improve functional mobility with his workouts.  Pt will improve his functional limitation score to 20% for return to PLOF.    PLAN   Plan of care Certification: 11/4/2022 to 12/16/2022.    Outpatient Physical Therapy 2 times weekly for 6 weeks to include the following interventions: Manual Therapy, Neuromuscular Re-ed, Patient Education, Therapeutic Activities, and Therapeutic Exercise.     Madison Arreola, PT      I CERTIFY THE NEED FOR THESE SERVICES FURNISHED UNDER THIS PLAN OF TREATMENT AND WHILE UNDER MY CARE   Physician's comments:     Physician's Signature: ___________________________________________________

## 2022-11-09 ENCOUNTER — CLINICAL SUPPORT (OUTPATIENT)
Dept: REHABILITATION | Facility: OTHER | Age: 37
End: 2022-11-09
Payer: COMMERCIAL

## 2022-11-09 DIAGNOSIS — R29.898 IMPAIRED FLEXIBILITY OF LOWER EXTREMITY: Primary | ICD-10-CM

## 2022-11-09 PROCEDURE — 97112 NEUROMUSCULAR REEDUCATION: CPT | Mod: PN

## 2022-11-09 PROCEDURE — 97110 THERAPEUTIC EXERCISES: CPT | Mod: PN

## 2022-11-11 ENCOUNTER — HOSPITAL ENCOUNTER (OUTPATIENT)
Dept: RADIOLOGY | Facility: OTHER | Age: 37
Discharge: HOME | End: 2022-11-11
Attending: NURSE PRACTITIONER
Payer: COMMERCIAL

## 2022-11-11 DIAGNOSIS — I86.1 BILATERAL VARICOCELES: ICD-10-CM

## 2022-11-11 PROCEDURE — 76700 US EXAM ABDOM COMPLETE: CPT | Mod: 26,,, | Performed by: RADIOLOGY

## 2022-11-11 PROCEDURE — 76700 US ABDOMEN COMPLETE: ICD-10-PCS | Mod: 26,,, | Performed by: RADIOLOGY

## 2022-11-11 PROCEDURE — 76700 US EXAM ABDOM COMPLETE: CPT | Mod: TC

## 2022-11-15 ENCOUNTER — CLINICAL SUPPORT (OUTPATIENT)
Dept: REHABILITATION | Facility: OTHER | Age: 37
End: 2022-11-15
Payer: COMMERCIAL

## 2022-11-15 DIAGNOSIS — R29.898 IMPAIRED FLEXIBILITY OF LOWER EXTREMITY: Primary | ICD-10-CM

## 2022-11-15 PROCEDURE — 97110 THERAPEUTIC EXERCISES: CPT | Mod: PN

## 2022-11-15 NOTE — PROGRESS NOTES
Physical Therapy Treatment Note     Name: Nils Patino  Clinic Number: [id number]    Therapy Diagnosis:   Encounter Diagnosis   Name Primary?    Impaired flexibility of lower extremity Yes     Physician: Estefania Penaloza*    Visit Date: 11/15/2022    Physician Orders: PT Eval and Treat  Medical Diagnosis: Right hip pain (M25.551); Leg pain (M79.606)  Evaluation Date: 11/4/2022  Authorization Period Expiration: 12/31/2022  Plan of Care Certification Period: 11/4/2022 - 12/16/2022  Visit #/Visits authorized: 2/ 20 (3 visits total)  Re-assessment: due 12/4/2022  FOTO: 11/4/2022 (1/3)   PT/PTA visit: 0/6    Time In: 11:35 am  Time Out: 12:25 pm  Total Billable Time: 45 minutes    Precautions: Standard    Subjective     Pt reports: he didn't notice symptoms as much this weekend. Symptoms are not very intense but are sore, tingling, and dull/achey but were not there as much.  Siting and driving in the car are most irritating.   Also notes low low back discomfort near SIJ as well as pinching in the hips intermittently with deep squats  He was compliant with home exercise program.  Response to previous treatment: reduced symptoms  Functional change: reduced symptoms driving today    Pain: 2/10  Location: right posterior leg, numbness, tingling into heel, LBP      Objective     NILS received therapeutic exercises to develop strength, endurance, flexibility, and core stabilization for 40 minutes including:  LTR x2 minutes  Supine TA bracing 15 x 10 sec  Supine iso clamshell with TrA activation and pilates ring 5-10 second holds x3 minutes  Supine iso ADD with TrA activation and pilates ring 5-10 second holds x3 minutes  Quadruped TA bracing 15x5 sec  Cat/cow with pelvic tilt cueing 2x 10  Quadruped leg extension w/ pelvic neutral  Bird dog x10  Gastroc slantboard stretch 3x 30 seconds    Not performed today:  Standing hamstring mobilization   SLR w/ TA activation 2x12  Bridging w/ knee ext and neutral pelvis  2x12  Supine shoulder flexion w/ TA x25  Kneeling hip flexor stretch w/ banded lateral distraction x20, 3 sec holds    BERNIE participated in neuromuscular re-education activities to improve: Coordination and neurla mobility for 5 minutes. The following activities were included:  Seated sciatic nerve glides x20    Not performed today:  Supine sciatic nerve glides with ankle inversion and towel roll x20  Standing hamstring mobilization x25 on chair  Piriformis stretching  Prone multifidi activation w/ very mini hip ext x20        Home Exercises Provided and Patient Education Provided     Education provided:   - updated HEP  - guideline for participating in recreational activities (walking, avoiding running for now, upper body strengthening, beginner core work, avoiding dead lifts, split squats to tolerance) and avoiding exercises/reducing range if experiencing pain  - exercise form and purpose  - isometric vs. Concentric vs eccentric muscle activity    Written Home Exercises Provided: yes.  Exercises were reviewed and BERNIE was able to demonstrate them prior to the end of the session.  BERNIE demonstrated good  understanding of the education provided.     See EMR under Patient Instructions for exercises provided.     Assessment     Patient tolerated treatment well today without increase in symptoms reported.  Incorporated gentle spinal mobilizations as well as SIJ stabilization due to increased reports of pain in this area.  Consider adding plank progressions in the future to incorporate into home program/gym workouts. Patient also describes symptoms in the bottom of the foot which may be attributed to sciatica symptoms but also with plantar fasciitis as the foot/heel pain is not always present with sciatica symptoms and reports the pain feels different in this area.  Consider additional assessment for the foot/ankle in the future to address these symptoms.       BERNIE is progressing well towards his goals.   Pt  prognosis is Good.     Pt will continue to benefit from skilled outpatient physical therapy to address the deficits listed in the problem list box on initial evaluation, provide pt/family education and to maximize pt's level of independence in the home and community environment.     Pt's spiritual, cultural and educational needs considered and pt agreeable to plan of care and goals.     Anticipated barriers to physical therapy: none    Goals: Short Term Goals: 3 weeks   Pt will improve his SLR measurement 15 degrees bilaterally.  Pt will be able to maintain a neutral pelvis with bird dogs sets.  Pt will be independent with his HEP.     Long Term Goals: 6 weeks   Pt will be able to sit for <1 hr without an increase in symptoms to tolerate work.  Pt will be able to increase his SLR measurement 25 degrees from IE to improve functional mobility with his workouts.  Pt will improve his functional limitation score to 20% for return to PLOF.       Plan     Continue with established PT POC and progress per pt tolerance.      Plan of care Certification: 11/4/2022 to 12/16/2022.     Outpatient Physical Therapy 2 times weekly for 6 weeks to include the following interventions: Manual Therapy, Neuromuscular Re-ed, Patient Education, Therapeutic Activities, and Therapeutic Exercise.    Christen Dodd, PT

## 2022-11-15 NOTE — PATIENT INSTRUCTIONS
Access Code: [email or web address]  URL: [email or web address]  Date: 11/15/2022  Prepared by: Christen Dodd    Exercises  Supine Hip Adduction Isometric with Ball - 1 x daily - 1 sets - 20 reps - 5 hold  Hooklying Isometric Clamshell - 1 x daily - 1 sets - 20 reps - 5 second hold  Quadruped Cat Cow - 1 x daily - 1 sets - 15 reps - 3-5 sec hold

## 2022-11-18 ENCOUNTER — CLINICAL SUPPORT (OUTPATIENT)
Dept: REHABILITATION | Facility: OTHER | Age: 37
End: 2022-11-18
Payer: COMMERCIAL

## 2022-11-18 DIAGNOSIS — R29.898 IMPAIRED FLEXIBILITY OF LOWER EXTREMITY: Primary | ICD-10-CM

## 2022-11-18 PROCEDURE — 97110 THERAPEUTIC EXERCISES: CPT | Mod: PN

## 2022-11-18 PROCEDURE — 97140 MANUAL THERAPY 1/> REGIONS: CPT | Mod: PN

## 2022-11-18 NOTE — PROGRESS NOTES
Physical Therapy Treatment Note     Name: Nils Patino  Clinic Number: [id number]    Therapy Diagnosis:   Encounter Diagnosis   Name Primary?    Impaired flexibility of lower extremity Yes       Physician: Estefania Penaloza*    Visit Date: 11/18/2022    Physician Orders: PT Eval and Treat  Medical Diagnosis: Right hip pain (M25.551); Leg pain (M79.606)  Evaluation Date: 11/4/2022  Authorization Period Expiration: 12/31/2022  Plan of Care Certification Period: 11/4/2022 - 12/16/2022  Visit #/Visits authorized: 3/ 20 (4 visits total)  Re-assessment: due 12/4/2022  FOTO: 11/4/2022 (1/3)   PT/PTA visit: 0/6    Time In: 11:35 am  Time Out: 12:25 pm  Total Billable Time: 45 minutes    Precautions: Standard    Subjective     Pt reports: his numbness down his leg has been much better and he hasn't noticed his heel pain as much either. He is mainly feeling the pinching in his hips R>L that bothers him when he is working out.   He was compliant with home exercise program.  Response to previous treatment: reduced symptoms  Functional change: reduced symptoms driving today    Pain: 2/10  Location: Bilateral hips R>L    Objective     NILS received therapeutic exercises to develop strength, endurance, flexibility, and core stabilization for 25 minutes including:    Cat/cow with pelvic tilt cueing 2x 10  Bird dog x15  Gastroc slantboard stretch 3x 30 seconds  +fire hydrants 2x15  +lateral walking w/ x band  +Prone hip IR/ER x20  +Bridging w/ knee extensions  +side planks w/ hip abduction  Supine iso clamshell with TrA activation and pilates ring 5-10 second holds x3 minutes    Nils received manual therapy for joint mobilization of his hips for 14 minutes including:  Lateral/caudal traction in neutral and in ER bilaterally grade iv supine  Prone hip PA w/ ER grade iv  Long axis distraction in supine grade iv      Not performed today:  Standing hamstring mobilization   SLR w/ TA activation 2x12  Bridging w/ knee ext and  "neutral pelvis 2x12  Supine shoulder flexion w/ TA x25  Kneeling hip flexor stretch w/ banded lateral distraction x20, 3 sec holds  LTR x2 minutes  Supine TA bracing 15 x 10 sec    Supine iso ADD with TrA activation and pilates ring 5-10 second holds x3 minutes  Quadruped TA bracing 15x5 sec    BERNIE participated in neuromuscular re-education activities to improve: Coordination and neurla mobility for 5 minutes. The following activities were included:  Seated sciatic nerve glides x20    Not performed today:  Supine sciatic nerve glides with ankle inversion and towel roll x20  Standing hamstring mobilization x25 on chair  Piriformis stretching  Prone multifidi activation w/ very mini hip ext x20        Home Exercises Provided and Patient Education Provided     Education provided:   - updated HEP  - guideline for participating in recreational activities (walking, avoiding running for now, upper body strengthening, beginner core work, avoiding dead lifts, split squats to tolerance) and avoiding exercises/reducing range if experiencing pain  - exercise form and purpose  - isometric vs. Concentric vs eccentric muscle activity    Written Home Exercises Provided: yes.  Exercises were reviewed and BERNIE was able to demonstrate them prior to the end of the session.  BERNIE demonstrated good  understanding of the education provided.     See EMR under Patient Instructions for exercises provided.     Assessment   Pt presents with limited R hip mobility affecting his ability to complete squats and pr reports a "pinching" feeling which did improve with hip mobilizations. Pt is progressing with hip and core stabilization. Discussed pt's plantar fascitis today and management of it as it is likely not associated with his sciatic pain.    BERNIE is progressing well towards his goals.   Pt prognosis is Good.     Pt will continue to benefit from skilled outpatient physical therapy to address the deficits listed in the problem list box on " initial evaluation, provide pt/family education and to maximize pt's level of independence in the home and community environment.     Pt's spiritual, cultural and educational needs considered and pt agreeable to plan of care and goals.     Anticipated barriers to physical therapy: none    Goals: Short Term Goals: 3 weeks   Pt will improve his SLR measurement 15 degrees bilaterally.  Pt will be able to maintain a neutral pelvis with bird dogs sets.  Pt will be independent with his HEP.     Long Term Goals: 6 weeks   Pt will be able to sit for <1 hr without an increase in symptoms to tolerate work.  Pt will be able to increase his SLR measurement 25 degrees from IE to improve functional mobility with his workouts.  Pt will improve his functional limitation score to 20% for return to PLOF.       Plan     Continue with established PT POC and progress per pt tolerance.      Plan of care Certification: 11/4/2022 to 12/16/2022.     Outpatient Physical Therapy 2 times weekly for 6 weeks to include the following interventions: Manual Therapy, Neuromuscular Re-ed, Patient Education, Therapeutic Activities, and Therapeutic Exercise.    Madison Arreola, PT

## 2022-11-22 ENCOUNTER — CLINICAL SUPPORT (OUTPATIENT)
Dept: REHABILITATION | Facility: OTHER | Age: 37
End: 2022-11-22
Payer: COMMERCIAL

## 2022-11-22 ENCOUNTER — TELEPHONE (OUTPATIENT)
Dept: UROLOGY | Facility: CLINIC | Age: 37
End: 2022-11-22
Payer: COMMERCIAL

## 2022-11-22 DIAGNOSIS — R29.898 IMPAIRED FLEXIBILITY OF LOWER EXTREMITY: Primary | ICD-10-CM

## 2022-11-22 PROCEDURE — 97110 THERAPEUTIC EXERCISES: CPT | Mod: PN,CQ

## 2022-11-22 PROCEDURE — 97140 MANUAL THERAPY 1/> REGIONS: CPT | Mod: PN,CQ

## 2022-11-22 NOTE — PROGRESS NOTES
Physical Therapy Treatment Note     Name: Nils Patino  Clinic Number: [id number]    Therapy Diagnosis:   Encounter Diagnosis   Name Primary?    Impaired flexibility of lower extremity Yes     Physician: Estefania Penaloza*    Visit Date: 11/22/2022    Physician Orders: PT Eval and Treat  Medical Diagnosis: Right hip pain (M25.551); Leg pain (M79.606)  Evaluation Date: 11/4/2022  Authorization Period Expiration: 12/31/2022  Plan of Care Certification Period: 11/4/2022 - 12/16/2022  Visit #/Visits authorized: 4/ 20 (5 visits total)  Re-assessment: due 12/4/2022  FOTO: 11/4/2022 (1/3)   PT/PTA visit: 1/6    Time In: 11:30 am  Time Out: 12:15 pm  Total Billable Time: 45 minutes    Precautions: Standard    Subjective     Pt reports: He went for a short run this last week and did not notice anything from the back, no sciatica symptoms down his leg. The R hip continues to bother him when squatting to a deep position.  He was compliant with home exercise program.  Response to previous treatment: reduced symptoms  Functional change: reduced symptoms driving today    Pain: 2/10  Location: Bilateral hips R>L    Objective     NILS received therapeutic exercises to develop strength, endurance, flexibility, and core stabilization for 30 minutes including:    Cat/cow with pelvic tilt cueing 2x 10  Bird dog x15  Gastroc slantboard stretch 3x 30 seconds  fire hydrants 2x15  lateral walking w/ x band  Prone hip IR/ER x20  Bridging w/ knee extensions  side planks w/ hip abduction 10 with knee bent 10 with knee extended  Supine iso clamshell with TrA activation and pilates ring 5-10 second holds x3 minutes  +Self hip distraction mobilization using red super band lateral and long axis     Nils received manual therapy for joint mobilization of his hips for 15 minutes including:  Lateral/caudal traction in neutral and in ER bilaterally grade iv supine  Prone hip PA w/ ER grade iv  Long axis distraction in supine grade  iv      Not performed today:  Standing hamstring mobilization   SLR w/ TA activation 2x12  Bridging w/ knee ext and neutral pelvis 2x12  Supine shoulder flexion w/ TA x25  Kneeling hip flexor stretch w/ banded lateral distraction x20, 3 sec holds  LTR x2 minutes  Supine TA bracing 15 x 10 sec    Supine iso ADD with TrA activation and pilates ring 5-10 second holds x3 minutes  Quadruped TA bracing 15x5 sec    BERNIE participated in neuromuscular re-education activities to improve: Coordination and neurla mobility for00 minutes. The following activities were included:  Seated sciatic nerve glides x20    Not performed today:  Supine sciatic nerve glides with ankle inversion and towel roll x20  Standing hamstring mobilization x25 on chair  Piriformis stretching  Prone multifidi activation w/ very mini hip ext x20        Home Exercises Provided and Patient Education Provided     Education provided:   - updated HEP  - guideline for participating in recreational activities (walking, avoiding running for now, upper body strengthening, beginner core work, avoiding dead lifts, split squats to tolerance) and avoiding exercises/reducing range if experiencing pain  - exercise form and purpose  - isometric vs. Concentric vs eccentric muscle activity    Written Home Exercises Provided: yes.  Exercises were reviewed and BERNIE was able to demonstrate them prior to the end of the session.  BERNIE demonstrated good  understanding of the education provided.     See EMR under Patient Instructions for exercises provided.     Assessment   Patient continued to present to therapy today with no sciatica pain or pain in his back. Treatment today focused on hip mobility and strengthening to decrease pain and improve endurance. Initiated self hip mobilizations today using red [place] man, patient was able to demonstrate with good understanding. Plan to continue with hip and core exercises as tolerated per patient.     BERNIE is progressing well  towards his goals.   Pt prognosis is Good.     Pt will continue to benefit from skilled outpatient physical therapy to address the deficits listed in the problem list box on initial evaluation, provide pt/family education and to maximize pt's level of independence in the home and community environment.     Pt's spiritual, cultural and educational needs considered and pt agreeable to plan of care and goals.     Anticipated barriers to physical therapy: none    Goals: Short Term Goals: 3 weeks   Pt will improve his SLR measurement 15 degrees bilaterally.  Pt will be able to maintain a neutral pelvis with bird dogs sets.  Pt will be independent with his HEP.     Long Term Goals: 6 weeks   Pt will be able to sit for <1 hr without an increase in symptoms to tolerate work.  Pt will be able to increase his SLR measurement 25 degrees from IE to improve functional mobility with his workouts.  Pt will improve his functional limitation score to 20% for return to PLOF.       Plan     Continue with established PT POC and progress per pt tolerance.      Plan of care Certification: 11/4/2022 to 12/16/2022.     Outpatient Physical Therapy 2 times weekly for 6 weeks to include the following interventions: Manual Therapy, Neuromuscular Re-ed, Patient Education, Therapeutic Activities, and Therapeutic Exercise.    Benjy Douglas, PTA

## 2022-11-22 NOTE — TELEPHONE ENCOUNTER
Patient stated that [facility] [employer] faxed SA results to our office.   Unable to locate records, but [facility] [employer] contacted and is re-faxing over results.   Informed patient that Bonny Quintero NP will call with results as soon as received.

## 2022-11-22 NOTE — TELEPHONE ENCOUNTER
----- Message from Ilia Rodney sent at 11/22/2022  1:34 PM CST -----  Regarding: Results  Contact: BERNIE LEE [[id number]]  Name of Who is Calling: BERNIE LEE [[id number]]      What is the request in detail: Would like to speak with staff in regards to semen analysis results. Please advise      Can the clinic reply by [facility]: yes      What Number to Call Back if not in [facility]: [phone]

## 2022-11-28 ENCOUNTER — TELEPHONE (OUTPATIENT)
Dept: UROLOGY | Facility: CLINIC | Age: 37
End: 2022-11-28
Payer: COMMERCIAL

## 2022-11-28 ENCOUNTER — PATIENT MESSAGE (OUTPATIENT)
Dept: UROLOGY | Facility: CLINIC | Age: 37
End: 2022-11-28
Payer: COMMERCIAL

## 2022-11-28 NOTE — TELEPHONE ENCOUNTER
"----- Message from Cherelle Stern RN sent at 11/25/2022 12:39 PM CST -----  Regarding: FW: Requesting a call-  MRN:  [id number]  Contact: NILS LEE [[id number]]  Eliane Draper,    This patient would like to speak to you more about the results of his Semen Analysis with [facility]. It is in the Media tab.    Cherelle Singh  ----- Message -----  From: Philippe Causey MD  Sent: 11/23/2022   1:05 PM CST  To: Cherelle Stern RN  Subject: RE: Requesting a call                            He has only seen Bonny in the past, so this should go to her. If he has specific things he wants to discuss w/ me he'll need an appointment. Also - his semen analysis was 100% normal.    ----- Message -----  From: Cherelle Stern RN  Sent: 11/23/2022  10:10 AM CST  To: Philippe Causey MD  Subject: FW: Requesting a call                            This patient would like to speak to you more about the results of his Semen Analysis with [facility]. It is in the Media tab.    Cherelle Singh    ----- Message -----  From: Myranda Matta RN  Sent: 11/21/2022   5:25 PM CST  To: Cherelle Stern RN  Subject: FW: Requesting a call                              ----- Message -----  From: Hilary Shaw  Sent: 11/18/2022  12:28 PM CST  To: [facility] Draper Staff  Subject: Requesting a call                                Name of Who is Calling: Nils Lee             What is the request in detail:   Pt states he is requesting a call back in regards to results, he states he is not referring to the "US Abdomen " Results .   Please advise     Fyi- this pt has two active accounts     Can the clinic reply by [facility]: No           [facility]:[phone]             "

## 2022-11-29 ENCOUNTER — CLINICAL SUPPORT (OUTPATIENT)
Dept: REHABILITATION | Facility: OTHER | Age: 37
End: 2022-11-29
Payer: COMMERCIAL

## 2022-11-29 DIAGNOSIS — R29.898 IMPAIRED FLEXIBILITY OF LOWER EXTREMITY: Primary | ICD-10-CM

## 2022-11-29 PROCEDURE — 97140 MANUAL THERAPY 1/> REGIONS: CPT | Mod: PN,CQ

## 2022-11-29 PROCEDURE — 97110 THERAPEUTIC EXERCISES: CPT | Mod: PN,CQ

## 2022-11-29 NOTE — PROGRESS NOTES
Physical Therapy Treatment Note     Name: Nils Patino  Clinic Number: [id number]    Therapy Diagnosis:   Encounter Diagnosis   Name Primary?    Impaired flexibility of lower extremity Yes       Physician: Estefania Penaloza*    Visit Date: 11/29/2022    Physician Orders: PT Eval and Treat  Medical Diagnosis: Right hip pain (M25.551); Leg pain (M79.606)  Evaluation Date: 11/4/2022  Authorization Period Expiration: 12/31/2022  Plan of Care Certification Period: 11/4/2022 - 12/16/2022  Visit #/Visits authorized: 5/ 20 (6 visits total)  Re-assessment: due 12/4/2022  FOTO: 11/4/2022 (1/3)   PT/PTA visit: 2/6    Time In: 12:20 pm  Time Out: 1:00 pm  Total Billable Time: 40 minutes    Precautions: Standard    Subjective     Pt reports: Back continues to feel good, no reports of pain or discomfort. The hips continue to bother him when squatting or after running.    He was compliant with home exercise program.  Response to previous treatment: reduced symptoms  Functional change: reduced symptoms driving today    Pain: 2/10  Location: Bilateral hips R>L    Objective     NILS received therapeutic exercises to develop strength, endurance, flexibility, and core stabilization for 30 minutes including:    +Education on proper squatting mechanics and squat assessment   Cat/cow with pelvic tilt cueing 2x 10  Bird dog x15  Gastroc slantboard stretch 3x 30 seconds  fire hydrants 2x15  lateral walking w/ x band  Prone hip IR/ER x20  Bridging w/ knee extensions  side planks w/ hip abduction 10 with knee bent 10 with knee extended  +Clams GTB x10 10 sec holds B  +Prone hip ext knee bent x15 B   Supine iso clamshell with TrA activation and pilates ring 5-10 second holds x3 minutes  +Self hip distraction mobilization using red super band lateral and long axis     Nils received manual therapy for joint mobilization of his hips for 10 minutes including:  Lateral/caudal traction in neutral and in ER bilaterally grade iv  supine  Prone hip PA w/ ER grade iv  Long axis distraction in supine grade iv      Not performed today:  Standing hamstring mobilization   SLR w/ TA activation 2x12  Bridging w/ knee ext and neutral pelvis 2x12  Supine shoulder flexion w/ TA x25  Kneeling hip flexor stretch w/ banded lateral distraction x20, 3 sec holds  LTR x2 minutes  Supine TA bracing 15 x 10 sec    Supine iso ADD with TrA activation and pilates ring 5-10 second holds x3 minutes  Quadruped TA bracing 15x5 sec    BERNIE participated in neuromuscular re-education activities to improve: Coordination and neurla mobility for00 minutes. The following activities were included:  Seated sciatic nerve glides x20    Not performed today:  Supine sciatic nerve glides with ankle inversion and towel roll x20  Standing hamstring mobilization x25 on chair  Piriformis stretching  Prone multifidi activation w/ very mini hip ext x20        Home Exercises Provided and Patient Education Provided     Education provided:   - updated HEP  - guideline for participating in recreational activities (walking, avoiding running for now, upper body strengthening, beginner core work, avoiding dead lifts, split squats to tolerance) and avoiding exercises/reducing range if experiencing pain  - exercise form and purpose  - isometric vs. Concentric vs eccentric muscle activity    Written Home Exercises Provided: yes.  Exercises were reviewed and BERNIE was able to demonstrate them prior to the end of the session.  BERNIE demonstrated good  understanding of the education provided.     See EMR under Patient Instructions for exercises provided.     Assessment   Patient continued to present to therapy with no increase of pain in his back or radicular symptoms. Treatment today focused on glute and hip strengthening with good tolerance, patient demonstrated increased fatigue during clamshell holds and hip ext exercises. Performed squat assessment at end of session, where patient demonstrated  limited ankle mobility in deep squat, causing hip to hinge forward. Plan to continue exercises as tolerated per patient.     BERNIE is progressing well towards his goals.   Pt prognosis is Good.     Pt will continue to benefit from skilled outpatient physical therapy to address the deficits listed in the problem list box on initial evaluation, provide pt/family education and to maximize pt's level of independence in the home and community environment.     Pt's spiritual, cultural and educational needs considered and pt agreeable to plan of care and goals.     Anticipated barriers to physical therapy: none    Goals: Short Term Goals: 3 weeks   Pt will improve his SLR measurement 15 degrees bilaterally.  Pt will be able to maintain a neutral pelvis with bird dogs sets.  Pt will be independent with his HEP.     Long Term Goals: 6 weeks   Pt will be able to sit for <1 hr without an increase in symptoms to tolerate work.  Pt will be able to increase his SLR measurement 25 degrees from IE to improve functional mobility with his workouts.  Pt will improve his functional limitation score to 20% for return to PLOF.       Plan     Continue with established PT POC and progress per pt tolerance.      Plan of care Certification: 11/4/2022 to 12/16/2022.     Outpatient Physical Therapy 2 times weekly for 6 weeks to include the following interventions: Manual Therapy, Neuromuscular Re-ed, Patient Education, Therapeutic Activities, and Therapeutic Exercise.    Benjy Douglas, PTA          3 = A little assistance

## 2022-12-02 ENCOUNTER — CLINICAL SUPPORT (OUTPATIENT)
Dept: REHABILITATION | Facility: OTHER | Age: 37
End: 2022-12-02
Payer: COMMERCIAL

## 2022-12-02 DIAGNOSIS — R29.898 IMPAIRED FLEXIBILITY OF LOWER EXTREMITY: Primary | ICD-10-CM

## 2022-12-02 PROCEDURE — 97140 MANUAL THERAPY 1/> REGIONS: CPT | Mod: PN

## 2022-12-02 PROCEDURE — 97110 THERAPEUTIC EXERCISES: CPT | Mod: PN

## 2022-12-02 NOTE — PROGRESS NOTES
Physical Therapy Progress Note     Name: Nils Patino  Clinic Number: [id number]    Therapy Diagnosis:   Encounter Diagnosis   Name Primary?    Impaired flexibility of lower extremity Yes         Physician: Estefania Penaloza*    Visit Date: 12/2/2022    Physician Orders: PT Eval and Treat  Medical Diagnosis: Right hip pain (M25.551); Leg pain (M79.606)  Evaluation Date: 11/4/2022  Authorization Period Expiration: 12/31/2022  Plan of Care Certification Period: 11/4/2022 - 12/16/2022  Visit #/Visits authorized: 6/ 20 (7 visits total)  Re-assessment: due 1/2/2022  FOTO: 11/4/2022 (1/3)   PT/PTA visit: 0/6    Time In: 12:20 pm  Time Out: 1:00 pm  Total Billable Time: 40 minutes    Precautions: Standard    Subjective     Pt reports: Pt reports that his back is doing well and his radicular symptoms are minimal since starting PT. He reports that his main complaint is his bilateral anterolatearl hips (R>L) pain. He states that he can't sit comfortably for a long time and he feels the tightness with squatting.  He was compliant with home exercise program.    Response to previous treatment: reduced symptoms  Functional change: reduced symptoms driving today    Pain: 2/10  Location: Bilateral hips R>L    Objective     NILS received therapeutic exercises to develop strength, endurance, flexibility, and core stabilization for 32 minutes including:    +Education on proper squatting mechanics and squat assessment   Cat/cow with pelvic tilt cueing 2x 10  Bird dog x15  Gastroc slantboard stretch 3x 30 seconds  fire hydrants 2x15  lateral walking w/ x band around feet   Prone hip IR/ER x20  Bridging w/ knee extensions  side planks w/ hip abduction 20  with knee extended  +Clams GTB x10 10 sec holds B  +Prone hip ext knee bent x15 B   Supine iso clamshell with TrA activation and pilates ring 5-10 second holds x3 minutes  +Self hip distraction mobilization using red super band lateral and long axis   + s/l hip abduction  x20  +s/l hip circles x20  + SL RDL with pallof press x20      Nils received manual therapy for joint mobilization of his hips for 08 minutes including:  Lateral/caudal traction in neutral and in ER bilaterally grade iv supine  Prone hip PA w/ ER grade iv  Long axis distraction in supine grade iv      Not performed today:  Standing hamstring mobilization   SLR w/ TA activation 2x12  Bridging w/ knee ext and neutral pelvis 2x12  Supine shoulder flexion w/ TA x25  Kneeling hip flexor stretch w/ banded lateral distraction x20, 3 sec holds  LTR x2 minutes  Supine TA bracing 15 x 10 sec    Supine iso ADD with TrA activation and pilates ring 5-10 second holds x3 minutes  Quadruped TA bracing 15x5 sec    NILS participated in neuromuscular re-education activities to improve: Coordination and neurla mobility for00 minutes. The following activities were included:  Seated sciatic nerve glides x20    Not performed today:  Supine sciatic nerve glides with ankle inversion and towel roll x20  Standing hamstring mobilization x25 on chair  Piriformis stretching  Prone multifidi activation w/ very mini hip ext x20        Home Exercises Provided and Patient Education Provided     Education provided:   - updated HEP  - guideline for participating in recreational activities (walking, avoiding running for now, upper body strengthening, beginner core work, avoiding dead lifts, split squats to tolerance) and avoiding exercises/reducing range if experiencing pain  - exercise form and purpose  - isometric vs. Concentric vs eccentric muscle activity    Written Home Exercises Provided: yes.  Exercises were reviewed and NILS was able to demonstrate them prior to the end of the session.  NILS demonstrated good  understanding of the education provided.     See EMR under Patient Instructions for exercises provided.     Assessment     Pt has made good progress with physical therapy so far and has had a significant decrease in his back pain and  radicular symptoms. Pt's main complaint  is now his bilateral anterolateral hip pain. Pt demonstrates increased TFL tone and tenderness as well as decreased glute strength that is limiting his ability to sit for long periods of time and to tolerate squatting. Pt will continue to benefit from skilled PT to address HEP education, lateral hip strengthening, soft tissue mobility, hip ROM and education on exercise form for return to the all activities.    BERNIE is progressing well towards his goals.   Pt prognosis is Good.     Pt will continue to benefit from skilled outpatient physical therapy to address the deficits listed in the problem list box on initial evaluation, provide pt/family education and to maximize pt's level of independence in the home and community environment.     Pt's spiritual, cultural and educational needs considered and pt agreeable to plan of care and goals.     Anticipated barriers to physical therapy: none    Goals: Short Term Goals: 3 weeks   Pt will improve his SLR measurement 15 degrees bilaterally.  Pt will be able to maintain a neutral pelvis with bird dogs sets.  Pt will be independent with his HEP.     Long Term Goals: 6 weeks   Pt will be able to sit for <1 hr without an increase in symptoms to tolerate work.  Pt will be able to increase his SLR measurement 25 degrees from IE to improve functional mobility with his workouts.  Pt will improve his functional limitation score to 20% for return to PLOF.       Plan     Continue with established PT POC and progress per pt tolerance.      Plan of care Certification: 11/4/2022 to 12/16/2022.     Outpatient Physical Therapy 2 times weekly for 6 weeks to include the following interventions: Manual Therapy, Neuromuscular Re-ed, Patient Education, Therapeutic Activities, and Therapeutic Exercise.    Madison Arreola, PT

## 2022-12-06 ENCOUNTER — CLINICAL SUPPORT (OUTPATIENT)
Dept: REHABILITATION | Facility: OTHER | Age: 37
End: 2022-12-06
Payer: COMMERCIAL

## 2022-12-06 DIAGNOSIS — R29.898 IMPAIRED FLEXIBILITY OF LOWER EXTREMITY: Primary | ICD-10-CM

## 2022-12-06 PROCEDURE — 97140 MANUAL THERAPY 1/> REGIONS: CPT | Mod: PN,CQ

## 2022-12-06 PROCEDURE — 97110 THERAPEUTIC EXERCISES: CPT | Mod: PN,CQ

## 2022-12-06 NOTE — PROGRESS NOTES
Physical Therapy Progress Note     Name: Nils Patino  Clinic Number: [id number]    Therapy Diagnosis:   Encounter Diagnosis   Name Primary?    Impaired flexibility of lower extremity Yes     Physician: Estefania Penaloza*    Visit Date: 12/6/2022    Physician Orders: PT Eval and Treat  Medical Diagnosis: Right hip pain (M25.551); Leg pain (M79.606)  Evaluation Date: 11/4/2022  Authorization Period Expiration: 12/31/2022  Plan of Care Certification Period: 11/4/2022 - 12/16/2022  Visit #/Visits authorized: 7/ 20 (8 visits total)  Re-assessment: due 1/2/2022  FOTO: 11/4/2022 (1/3)   PT/PTA visit: 0/6    Time In: 12:17pm  Time Out: 1:00 pm  Total Billable Time: 43 minutes    Precautions: Standard    Subjective     Pt reports: The back remains well, with no complaints of radicular symptoms since last visit. The hips feel about the same as they did last visit.   He was compliant with home exercise program.    Response to previous treatment: reduced symptoms  Functional change: reduced symptoms driving today    Pain: 2/10  Location: Bilateral hips R>L    Objective     NILS received therapeutic exercises to develop strength, endurance, flexibility, and core stabilization for 35 minutes including:    Cat/cow with pelvic tilt cueing 2x 10  Bird dog x15  Gastroc slantboard stretch 3x 30 seconds  fire hydrants RTB 2x15  lateral walking w/ x band around feet   Prone hip IR/ER x20  Bridging w/ knee extensions  side planks w/ hip abduction 20  with knee extended  Clams GTB x10 10 sec holds B  Prone hip ext knee bent x15 B   Supine iso clamshell with TrA activation and pilates ring 5-10 second holds x3 minutes  +Self hip distraction mobilization using red super band lateral and long axis    s/l hip abduction x20  s/l hip circles x20   SL RDL with pallof press 2x12 R/L       Nils received manual therapy for joint mobilization of his hips for 08 minutes including:  Lateral/caudal traction in neutral and in ER bilaterally  grade iv supine  Prone hip PA w/ ER grade iv  Long axis distraction in supine grade iv      Not performed today:  Standing hamstring mobilization   SLR w/ TA activation 2x12  Bridging w/ knee ext and neutral pelvis 2x12  Supine shoulder flexion w/ TA x25  Kneeling hip flexor stretch w/ banded lateral distraction x20, 3 sec holds  LTR x2 minutes  Supine TA bracing 15 x 10 sec    Supine iso ADD with TrA activation and pilates ring 5-10 second holds x3 minutes  Quadruped TA bracing 15x5 sec    BERNIE participated in neuromuscular re-education activities to improve: Coordination and neurla mobility for00 minutes. The following activities were included:  Seated sciatic nerve glides x20    Not performed today:  Supine sciatic nerve glides with ankle inversion and towel roll x20  Standing hamstring mobilization x25 on chair  Piriformis stretching  Prone multifidi activation w/ very mini hip ext x20        Home Exercises Provided and Patient Education Provided     Education provided:   - updated HEP  - guideline for participating in recreational activities (walking, avoiding running for now, upper body strengthening, beginner core work, avoiding dead lifts, split squats to tolerance) and avoiding exercises/reducing range if experiencing pain  - exercise form and purpose  - isometric vs. Concentric vs eccentric muscle activity    Written Home Exercises Provided: yes.  Exercises were reviewed and BERNIE was able to demonstrate them prior to the end of the session.  BERNIE demonstrated good  understanding of the education provided.     See EMR under Patient Instructions for exercises provided.     Assessment   Patient continues to present to therapy with no radicular symptoms. Treatment today focused on lateral hip and glute strengthening. Patient demonstrated increased fatigue with hip abduction exercises today. Decreased hip ER/IR range of motion continued on R LE. Continue hip range of motion and strengthening as tolerated per  patientNaomy GIBBS is progressing well towards his goals.   Pt prognosis is Good.     Pt will continue to benefit from skilled outpatient physical therapy to address the deficits listed in the problem list box on initial evaluation, provide pt/family education and to maximize pt's level of independence in the home and community environment.     Pt's spiritual, cultural and educational needs considered and pt agreeable to plan of care and goals.     Anticipated barriers to physical therapy: none    Goals: Short Term Goals: 3 weeks   Pt will improve his SLR measurement 15 degrees bilaterally.  Pt will be able to maintain a neutral pelvis with bird dogs sets.  Pt will be independent with his HEP.     Long Term Goals: 6 weeks   Pt will be able to sit for <1 hr without an increase in symptoms to tolerate work.  Pt will be able to increase his SLR measurement 25 degrees from IE to improve functional mobility with his workouts.  Pt will improve his functional limitation score to 20% for return to PLOF.       Plan     Continue with established PT POC and progress per pt tolerance.      Plan of care Certification: 11/4/2022 to 12/16/2022.     Outpatient Physical Therapy 2 times weekly for 6 weeks to include the following interventions: Manual Therapy, Neuromuscular Re-ed, Patient Education, Therapeutic Activities, and Therapeutic Exercise.    Benjy Douglas, PTA

## 2022-12-09 ENCOUNTER — CLINICAL SUPPORT (OUTPATIENT)
Dept: REHABILITATION | Facility: OTHER | Age: 37
End: 2022-12-09
Payer: COMMERCIAL

## 2022-12-09 DIAGNOSIS — R29.898 IMPAIRED FLEXIBILITY OF LOWER EXTREMITY: Primary | ICD-10-CM

## 2022-12-09 PROCEDURE — 97140 MANUAL THERAPY 1/> REGIONS: CPT | Mod: PN

## 2022-12-09 PROCEDURE — 97110 THERAPEUTIC EXERCISES: CPT | Mod: PN

## 2022-12-09 NOTE — PROGRESS NOTES
"  Physical Therapy Progress Note     Name: Nils Patino  Clinic Number: [id number]    Therapy Diagnosis:   Encounter Diagnosis   Name Primary?    Impaired flexibility of lower extremity Yes       Physician: Estefania Penaloza*    Visit Date: 12/9/2022    Physician Orders: PT Eval and Treat  Medical Diagnosis: Right hip pain (M25.551); Leg pain (M79.606)  Evaluation Date: 11/4/2022  Authorization Period Expiration: 12/31/2022  Plan of Care Certification Period: 11/4/2022 - 12/16/2022  Visit #/Visits authorized: 7/ 20 (8 visits total)  Re-assessment: due 1/2/2022  FOTO: 11/4/2022 (1/3)   PT/PTA visit: 0/6    Time In: 12: 30 pm  Time Out: 12:17 pm  Total Billable Time: 45 minutes    Precautions: Standard    Subjective     Pt reports: He still isn't having any back/ LE issues. Hip pain remains with squatting. He states that he has been trying to work out his lateral hip at the gym.    He was compliant with home exercise program.    Response to previous treatment: reduced symptoms  Functional change: reduced symptoms driving today    Pain: 2/10  Location: Bilateral hips R>L, anterolateral    Objective     NILS received therapeutic exercises to develop strength, endurance, flexibility, and core stabilization for 38 minutes including:    Cat/cow with pelvic tilt cueing 2x 10  Bird dog x15  Gastroc slantboard stretch 3x 30 seconds  fire hydrants RTB 2x15  lateral walking w/ x band around feet 4x40 ft  Prone hip IR/ER x20  Bridging w/ knee extensions  side planks w/ hip abduction 20  with knee extended  Clams GTB x10 10 sec holds B  Prone hip ext knee bent x15 B   Supine iso clamshell with TrA activation and pilates ring 5-10 second holds x3 minutes  +Self hip distraction mobilization using red super band lateral and long axis    s/l hip abduction x20  s/l hip circles x20   SL RDL with pallof press 2x12 R/  +3 way balance on black side bosu, fingertip assist on // bars  +cross over step up 10"   +monster walking green " theraband around feet 2x40 ft  +march hold to SL RLD on blue foam x20  +ankle self mobilization x20     NILS received therapeutic activity to develop strength, endurance, flexibility, and core stabilization for 35 minutes including:  Eduation squat from     Nils received manual therapy for joint mobilization of his hips for 06 minutes including:  Lateral/caudal traction in neutral and in ER bilaterally grade iv supine  Prone hip PA w/ ER grade iv  Long axis distraction in supine grade iv  Ankle PA bilateral      Not performed today:  Standing hamstring mobilization   SLR w/ TA activation 2x12  Bridging w/ knee ext and neutral pelvis 2x12  Supine shoulder flexion w/ TA x25  Kneeling hip flexor stretch w/ banded lateral distraction x20, 3 sec holds  LTR x2 minutes  Supine TA bracing 15 x 10 sec    Supine iso ADD with TrA activation and pilates ring 5-10 second holds x3 minutes  Quadruped TA bracing 15x5 sec    NILS participated in neuromuscular re-education activities to improve: Coordination and neurla mobility for00 minutes. The following activities were included:  Seated sciatic nerve glides x20    Not performed today:  Supine sciatic nerve glides with ankle inversion and towel roll x20  Standing hamstring mobilization x25 on chair  Piriformis stretching  Prone multifidi activation w/ very mini hip ext x20        Home Exercises Provided and Patient Education Provided     Education provided:   - updated HEP  - guideline for participating in recreational activities (walking, avoiding running for now, upper body strengthening, beginner core work, avoiding dead lifts, split squats to tolerance) and avoiding exercises/reducing range if experiencing pain  - exercise form and purpose  - isometric vs. Concentric vs eccentric muscle activity  -squatting form    Written Home Exercises Provided: yes.  Exercises were reviewed and NILS was able to demonstrate them prior to the end of the session.  NILS demonstrated good   understanding of the education provided.     See EMR under Patient Instructions for exercises provided.     Assessment   Patient's squatting form was assessed and he demonstrated increased posterior pelvic tilt and reported decreased pain when he went into slight anterior pelvic tilt. Pt also continues to demonstrate decreased ankle mobility contributing to his hip pain with squatting and he was shows some self ankle mobilizations.    BERNIE is progressing well towards his goals.   Pt prognosis is Good.     Pt will continue to benefit from skilled outpatient physical therapy to address the deficits listed in the problem list box on initial evaluation, provide pt/family education and to maximize pt's level of independence in the home and community environment.     Pt's spiritual, cultural and educational needs considered and pt agreeable to plan of care and goals.     Anticipated barriers to physical therapy: none    Goals: Short Term Goals: 3 weeks   Pt will improve his SLR measurement 15 degrees bilaterally.  Pt will be able to maintain a neutral pelvis with bird dogs sets.  Pt will be independent with his HEP.     Long Term Goals: 6 weeks   Pt will be able to sit for <1 hr without an increase in symptoms to tolerate work.  Pt will be able to increase his SLR measurement 25 degrees from IE to improve functional mobility with his workouts.  Pt will improve his functional limitation score to 20% for return to PLOF.       Plan     Continue with established PT POC and progress per pt tolerance.      Plan of care Certification: 11/4/2022 to 12/16/2022.     Outpatient Physical Therapy 2 times weekly for 6 weeks to include the following interventions: Manual Therapy, Neuromuscular Re-ed, Patient Education, Therapeutic Activities, and Therapeutic Exercise.    Madison Arreola, PT

## 2022-12-12 ENCOUNTER — CLINICAL SUPPORT (OUTPATIENT)
Dept: REHABILITATION | Facility: OTHER | Age: 37
End: 2022-12-12
Payer: COMMERCIAL

## 2022-12-12 DIAGNOSIS — R29.898 IMPAIRED FLEXIBILITY OF LOWER EXTREMITY: Primary | ICD-10-CM

## 2022-12-12 PROCEDURE — 97140 MANUAL THERAPY 1/> REGIONS: CPT | Mod: PN

## 2022-12-12 NOTE — PROGRESS NOTES
"  Physical Therapy Progress Note     Name: Nils Patino  Clinic Number: [id number]    Therapy Diagnosis:   Encounter Diagnosis   Name Primary?    Impaired flexibility of lower extremity Yes         Physician: Estefania Penaloza*    Visit Date: 12/12/2022    Physician Orders: PT Eval and Treat  Medical Diagnosis: Right hip pain (M25.551); Leg pain (M79.606)  Evaluation Date: 11/4/2022  Authorization Period Expiration: 12/31/2022  Plan of Care Certification Period: 11/4/2022 - 12/16/2022  Visit #/Visits authorized: 9/ 20 (10 visits total)  Re-assessment: due 1/2/2022  FOTO: 11/4/2022 (1/3)   PT/PTA visit: 0/6    Time In: 11:00 am  Time Out: 12:00 pm  Total Billable Time: 55 minutes    Precautions: Standard    Subjective     Pt reports: He is interested in the dry needling that he talked about earlier     He was compliant with home exercise program.    Response to previous treatment: reduced symptoms  Functional change: reduced symptoms driving today    Pain: 2/10  Location: Bilateral hips R>L, anterolateral    Objective     NILS received therapeutic exercises to develop strength, endurance, flexibility, and core stabilization for 0 minutes including:    Cat/cow with pelvic tilt cueing 2x 10  Bird dog x15  Gastroc slantboard stretch 3x 30 seconds  fire hydrants RTB 2x15  lateral walking w/ x band around feet 4x40 ft  Prone hip IR/ER x20  Bridging w/ knee extensions  side planks w/ hip abduction 20  with knee extended  Clams GTB x10 10 sec holds B  Prone hip ext knee bent x15 B   Supine iso clamshell with TrA activation and pilates ring 5-10 second holds x3 minutes  +Self hip distraction mobilization using red super band lateral and long axis    s/l hip abduction x20  s/l hip circles x20   SL RDL with pallof press 2x12 R/  +3 way balance on black side bosu, fingertip assist on // bars  +cross over step up 10"   +monster walking green theraband around feet 2x40 ft  +march hold to SL RLD on blue foam x20  +ankle " self mobilization x20     NILS received therapeutic activity to develop strength, endurance, flexibility, and core stabilization for 35 minutes including:  Eduation squat from     Nils received manual therapy for joint mobilization of his hips for 06 minutes including:  Lateral/caudal traction in neutral and in ER bilaterally grade iv supine  Prone hip PA w/ ER grade iv  Long axis distraction in supine grade iv  Ankle PA bilateral      Not performed today:  Standing hamstring mobilization   SLR w/ TA activation 2x12  Bridging w/ knee ext and neutral pelvis 2x12  Supine shoulder flexion w/ TA x25  Kneeling hip flexor stretch w/ banded lateral distraction x20, 3 sec holds  LTR x2 minutes  Supine TA bracing 15 x 10 sec    Supine iso ADD with TrA activation and pilates ring 5-10 second holds x3 minutes  Quadruped TA bracing 15x5 sec    NILS participated in neuromuscular re-education activities to improve: Coordination and neurla mobility for00 minutes. The following activities were included:  Seated sciatic nerve glides x20    Not performed today:  Supine sciatic nerve glides with ankle inversion and towel roll x20  Standing hamstring mobilization x25 on chair  Piriformis stretching  Prone multifidi activation w/ very mini hip ext x20    manual therapy to bilateral hips for 55 minutes consisting of:          Application of TDN: Pt educated on benefits and potential side effects of dry needling. Educated pt on benefits, precautions, side effects following TDN. Educated pt to use heat following treatment sessions if pt is experiencing pain or soreness. Pt verbalized good understanding of education.  Pt signed written consent to dry needling. Pt gave verbal consent for DN    Pt position: side lying    Pt received dry needling to the below listed muscles using 40, 30 mm needles.  TFL x 1   Glute med & max 3  Surrounded greater trochanter x 4   E-stim was applied via 2 channels at 2-4 Hz and 3-6 mA to tolerance x 15  minutes     Home Exercises Provided and Patient Education Provided     Education provided:   - updated HEP  - guideline for participating in recreational activities (walking, avoiding running for now, upper body strengthening, beginner core work, avoiding dead lifts, split squats to tolerance) and avoiding exercises/reducing range if experiencing pain  - exercise form and purpose  - isometric vs. Concentric vs eccentric muscle activity  -squatting form    Written Home Exercises Provided: yes.  Exercises were reviewed and BERNIE was able to demonstrate them prior to the end of the session.  BERNIE demonstrated good  understanding of the education provided.     See EMR under Patient Instructions for exercises provided.     Assessment   Verbal and written consent obtained prior to initiating dry needling treatment today.  Good soft tissue response to dry needling evident by multiple muscle twitches in bilateral glutes.  No adverse effects following treatment. He was educated on what to expect following the procedure, utilization of heat for muscular soreness with pt verbalizing understanding. Plan to continue with needling, add hip mobility exercises and progress strengthening exercises per pts tolerance.       BERNIE is progressing well towards his goals.   Pt prognosis is Good.     Pt will continue to benefit from skilled outpatient physical therapy to address the deficits listed in the problem list box on initial evaluation, provide pt/family education and to maximize pt's level of independence in the home and community environment.     Pt's spiritual, cultural and educational needs considered and pt agreeable to plan of care and goals.     Anticipated barriers to physical therapy: none    Goals: Short Term Goals: 3 weeks   Pt will improve his SLR measurement 15 degrees bilaterally.  Pt will be able to maintain a neutral pelvis with bird dogs sets.  Pt will be independent with his HEP.     Long Term Goals: 6 weeks   Pt  will be able to sit for <1 hr without an increase in symptoms to tolerate work.  Pt will be able to increase his SLR measurement 25 degrees from IE to improve functional mobility with his workouts.  Pt will improve his functional limitation score to 20% for return to PLOF.       Plan     Continue with established PT POC and progress per pt tolerance.      Plan of care Certification: 11/4/2022 to 12/16/2022.     Outpatient Physical Therapy 2 times weekly for 6 weeks to include the following interventions: Manual Therapy, Neuromuscular Re-ed, Patient Education, Therapeutic Activities, and Therapeutic Exercise.    Bettye Mills, PT

## 2022-12-20 ENCOUNTER — CLINICAL SUPPORT (OUTPATIENT)
Dept: REHABILITATION | Facility: OTHER | Age: 37
End: 2022-12-20
Payer: COMMERCIAL

## 2022-12-20 DIAGNOSIS — R29.898 IMPAIRED FLEXIBILITY OF LOWER EXTREMITY: Primary | ICD-10-CM

## 2022-12-20 PROCEDURE — 97110 THERAPEUTIC EXERCISES: CPT | Mod: PN

## 2022-12-20 PROCEDURE — 97164 PT RE-EVAL EST PLAN CARE: CPT | Mod: PN

## 2022-12-20 NOTE — PROGRESS NOTES
Physical Therapy Progress Note     Name: Nils Patino  Clinic Number: [id number]    Therapy Diagnosis:   Encounter Diagnosis   Name Primary?    Impaired flexibility of lower extremity Yes       Physician: Estefania Penaloza*    Visit Date: 12/20/2022    Physician Orders: PT Eval and Treat  Medical Diagnosis: Right hip pain (M25.551); Leg pain (M79.606)  Evaluation Date: 11/4/2022  Authorization Period Expiration: 12/31/2022  Plan of Care Certification Period: 11/4/2022 - 12/16/2022 --> Updated 12/20/2022 to 03/03/2023  Visit #/Visits authorized: 10/ 20 (11 visits total)  Re-assessment: due 1/20/2023   FOTO: 12/20/2022 (2/3)   PT/PTA visit: 0/6    Time In: 4:07 pm   Time Out: 5:15 pm  Total Billable Time: 60 minutes    Precautions: Standard    Subjective     Pt reports: See UPOC    Patient has a sedentary job, 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    He was compliant with home exercise program.    Response to previous treatment: no change in hip symptoms.   Functional change: resolved low back and sciatica symptoms    Pain: 1-2/10; 3/10 worst  Location: Bilateral hips R>L, anterolateral    Objective     NILS received therapeutic exercises to develop strength, endurance, flexibility, and core stabilization for 60 minutes including:  Re-assessment/UPOC  Supine marching with TrA bracing 2x 10 B  SLR with eccentric lowering for 5 seconds 2x 10 B  Supine Alt dead bug legs with YTB around feet x10 B    Not performed today:  Cat/cow with pelvic tilt cueing 2x 10  Bird dog x15  Gastroc slantboard stretch 3x 30 seconds  fire hydrants RTB 2x15  lateral walking w/ x band around feet 4x40 ft  Prone hip IR/ER x20  Bridging w/ knee extensions  side planks w/ hip abduction 20  with knee extended  Clams GTB x10 10 sec holds B  Prone hip ext knee bent x15 B   Supine iso clamshell with TrA activation and pilates ring 5-10 second holds x3 minutes  +Self hip distraction mobilization using red super band lateral and  "long axis    s/l hip abduction x20  s/l hip circles x20   SL RDL with pallof press 2x12 R/  +3 way balance on black side bosu, fingertip assist on // bars  +cross over step up 10"   +monster walking green theraband around feet 2x40 ft  +march hold to SL RLD on blue foam x20  +ankle self mobilization x20   Standing hamstring mobilization   SLR w/ TA activation 2x12  Bridging w/ knee ext and neutral pelvis 2x12  Supine shoulder flexion w/ TA x25  Kneeling hip flexor stretch w/ banded lateral distraction x20, 3 sec holds  LTR x2 minutes  Supine TA bracing 15 x 10 sec  Supine sciatic nerve glides with ankle inversion and towel roll x20  Standing hamstring mobilization x25 on chair  Piriformis stretching  Prone multifidi activation w/ very mini hip ext x20  Supine iso ADD with TrA activation and pilates ring 5-10 second holds x3 minutes  Quadruped TA bracing 15x5 sec    Nils received manual therapy for joint mobilization of his hips for 00 minutes including:  Lateral/caudal traction in neutral and in ER bilaterally grade iv supine  Prone hip PA w/ ER grade iv  Long axis distraction in supine grade iv  Ankle PA bilateral     Application of TDN: Pt educated on benefits and potential side effects of dry needling. Educated pt on benefits, precautions, side effects following TDN. Educated pt to use heat following treatment sessions if pt is experiencing pain or soreness. Pt verbalized good understanding of education.  Pt signed written consent to dry needling. Pt gave verbal consent for DN    Pt position: side lying    Pt received dry needling to the below listed muscles using 40, 30 mm needles.  TFL x 1   Glute med & max 3  Surrounded greater trochanter x 4   E-stim was applied via 2 channels at 2-4 Hz and 3-6 mA to tolerance x 15 minutes     Home Exercises Provided and Patient Education Provided     Education provided:   - updated HEP  - guideline for participating in recreational activities (walking, avoiding running for " now, upper body strengthening, beginner core work, avoiding dead lifts, split squats to tolerance) and avoiding exercises/reducing range if experiencing pain  - exercise form and purpose  - isometric vs. Concentric vs eccentric muscle activity  -squatting form    Written Home Exercises Provided: yes.  Exercises were reviewed and BERNIE was able to demonstrate them prior to the end of the session.  BERNIE demonstrated good  understanding of the education provided.     See EMR under Patient Instructions for exercises provided.     Assessment     See UPOC.      BERNIE is progressing well towards his goals.   Pt prognosis is Good.     Pt will continue to benefit from skilled outpatient physical therapy to address the deficits listed in the problem list box on initial evaluation, provide pt/family education and to maximize pt's level of independence in the home and community environment.     Pt's spiritual, cultural and educational needs considered and pt agreeable to plan of care and goals.     Anticipated barriers to physical therapy: none    Goals: Short Term Goals: 3 weeks   Pt will improve his SLR measurement 15 degrees bilaterally. In progress  Pt will be able to maintain a neutral pelvis with bird dogs sets. In progress  Pt will be independent with his HEP.MET     Long Term Goals: 6 weeks   Pt will be able to sit for <1 hr without an increase in symptoms to tolerate work. In progress  Pt will be able to increase his SLR measurement 25 degrees from IE to improve functional mobility with his workouts. In progress  Pt will improve his functional limitation score to 20% for return to PLOF. In progress       Plan     Continue with established PT POC and progress per pt tolerance.      Updated Certification Period: 12/20/2022 to 03/03/2023  Recommended Treatment Plan: 2 times per week for 10 weeks: Electrical Stimulation PRN, Gait Training, Manual Therapy, Moist Heat/ Ice, Neuromuscular Re-ed, Patient Education,  Therapeutic Activities, Therapeutic Exercise, and dry needling  Other Recommendations: appointment with MD for hips, possible imaging for hips    Christen Dodd, PT

## 2022-12-20 NOTE — PLAN OF CARE
Outpatient Therapy Updated Plan of Care     Visit Date: 12/20/2022  Name: Nils Patino  Clinic Number: [id number]    Therapy Diagnosis:   Encounter Diagnosis   Name Primary?    Impaired flexibility of lower extremity Yes     Physician: Estefania Penaloza*    Physician Orders: PT Eval and Treat  Medical Diagnosis: Right hip pain (M25.551); Leg pain (M79.606)  Evaluation Date: 11/4/2022    Prior Certification Period: 11/4/2022 to 12/16/2022  Current Certification Period:  12/20/2022 to 03/03/2023    Visits from Evaluation Date:  10  Total Visits Received: 11  Cancelled Visits: 1  No Show Visits: 0    Precautions:  Standard    Functional Level Prior to Evaluation:  Running 4 times a few times a week, working out 2-3 x a week    Subjective     Update: Pt reports: his original complaint of back and sciatica pain have improved completely, however, he notes the hip flexor pain has remained.  Overall he feels more stable and strong, however mobility doesn't really seem to have changed that much.  He has improved his activity tolerance and working out more now.  He reports hip flexor pain that occurs with squatting, taking large steps, and running.  Feels the pinch on the R while sitting with R LE in ER.  The pinching is present on the L with walking and running. He is able to sit cross-legged on the floor although R hip is tighter and has more discomfort that is reduced with APT in this position. He isn't sure how much the needling helped although he is open to more visits since that was just the first needling session.      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    He was compliant with home exercise program.     Response to previous treatment: no change in hip symptoms.   Functional change: resolved low back and sciatica symptoms     Pain: 1-2/10; 3/10 worst  Location: Bilateral hips R>L, anterolateral    Objective     Update:     Hypomobile hip joint/capsule on R  TTP and  tightness on L psoas muscle and B adductors; TFL and B iliacus     Lower Extremity Strength  Right LE   Left LE     Quadriceps: 5/5 Quadriceps: 5/5   Hamstrings: 5/5 Hamstrings:  5/5   Iliospoas: 5/5 Iliospoas: 5/5   Hip extension:  5/5 Hip extension: 5/5   Hip ER:  5/5 Hip ER: 5/5   Hip IR: 4/5 Hip IR: 4/5   Hip ADD 5/5 Hip Add 5/5   Hip ABD 5/5 Hip ABD 5/5      Functional movement:  OH squat: increased valgus with hips below neutral, slight winking of hips below 90 degrees    FADDIR: (-) B  BLANCA: (+) with pinching B        CMS Impairment/Limitation/Restriction for FOTO Hip Survey    Therapist reviewed FOTO scores for Nils Patino on 12/20/2022.   FOTO documents entered into [employer] - see Media section.    Limitation Score: 37%  Category: Mobility    Current : CJ = at least 20% but < 40% impaired, limited or restricted  Goal: CJ = at least 20% but < 40% impaired, limited or restricted          Assessment     Update: Patient reports significant improvement with initial complaints of low back/sciatic pain having completely resolved.  Continues to report anterior and lateral hip pain, particularly with sitting cross legged on the floor, with walking/running, and squatting below 90 degrees.  Patient displays weakness in hip ERs with deep squatting below parallel that may be contributing to pain as symptoms reduce with activation in this position.  Additionally, TTP in areas noted above including adductors which patient demonstrated increased sensitivity compared to other areas.  Plan to address hip mobility and strengthening in functional positions with continuation of dry needling to anterolateral hip and adductors.      Goals: Short Term Goals: 3 weeks   Pt will improve his SLR measurement 15 degrees bilaterally. In progress  Pt will be able to maintain a neutral pelvis with bird dogs sets. In progress  Pt will be independent with his HEP.MET     Long Term Goals: 6 weeks   Pt will be able to sit for <1 hr without an  increase in symptoms to tolerate work. In progress  Pt will be able to increase his SLR measurement 25 degrees from IE to improve functional mobility with his workouts. In progress  Pt will improve his functional limitation score to 20% for return to PLOF. In progress    Previous Short Term Goals Status:   in progress  New Short Term Goals Status:   1 goal met, 2 in progress  Long Term Goal Status:   continue per initial plan of care.  Reasons for Recertification of Therapy:   Patient will benefit from additional skilled physical therapy to address remaining complaints of hip pain affecting workout plan and recreational physical activity.     Plan     Updated Certification Period: 12/20/2022 to 03/03/2023  Recommended Treatment Plan: 2 times per week for 10 weeks: Electrical Stimulation PRN, Gait Training, Manual Therapy, Moist Heat/ Ice, Neuromuscular Re-ed, Patient Education, Therapeutic Activities, Therapeutic Exercise, and dry needling  Other Recommendations: appointment with MD for hips, possible imaging for hips    Christen Dodd, PT  12/20/2022    I CERTIFY THE NEED FOR THESE SERVICES FURNISHED UNDER THIS PLAN OF TREATMENT AND WHILE UNDER MY CARE    Physician's comments:        Physician's Signature: ___________________________________________________

## 2022-12-22 ENCOUNTER — CLINICAL SUPPORT (OUTPATIENT)
Dept: REHABILITATION | Facility: OTHER | Age: 37
End: 2022-12-22
Payer: COMMERCIAL

## 2022-12-22 DIAGNOSIS — R29.898 IMPAIRED FLEXIBILITY OF LOWER EXTREMITY: Primary | ICD-10-CM

## 2022-12-22 PROCEDURE — 97110 THERAPEUTIC EXERCISES: CPT | Mod: PN,CQ

## 2022-12-22 PROCEDURE — 97140 MANUAL THERAPY 1/> REGIONS: CPT | Mod: PN,CQ

## 2022-12-22 NOTE — PROGRESS NOTES
Physical Therapy Progress Note     Name: Nils Patino  Clinic Number: [id number]    Therapy Diagnosis:   Encounter Diagnosis   Name Primary?    Impaired flexibility of lower extremity Yes         Physician: Estefania Penaloza*    Visit Date: 12/22/2022    Physician Orders: PT Eval and Treat  Medical Diagnosis: Right hip pain (M25.551); Leg pain (M79.606)  Evaluation Date: 11/4/2022  Authorization Period Expiration: 12/31/2022  Plan of Care Certification Period: 11/4/2022 - 12/16/2022 --> Updated 12/20/2022 to 03/03/2023  Visit #/Visits authorized: 11/ 20 (12 visits total)  Re-assessment: due 1/20/2023   FOTO: 12/20/2022 (2/3)   PT/PTA visit: 1/6    Time In: 1:00 pm   Time Out: 1:45 pm  Total Billable Time: 45 minutes    Precautions: Standard    Subjective     Pt reports: The back and nerve issues continue to not bother him at all. Patient continues to be able to workout but is still unable to squat deeply due to the pinching pain in the anterior region of his hip.     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    He was compliant with home exercise program.    Response to previous treatment: no change in hip symptoms.   Functional change: resolved low back and sciatica symptoms    Pain: 1-2/10; 3/10 worst  Location: Bilateral hips R>L, anterolateral    Objective     NILS received therapeutic exercises to develop strength, endurance, flexibility, and core stabilization for 35 minutes including:  Supine marching with TrA bracing 2x 10 B  Hip circles x20 R/L   SLR with eccentric lowering for 5 seconds 2x 10 B  Supine Alt dead bug legs with YTB around feet x10 B  +monster walking green theraband around feet 2x40 ft  Lateral walks GTB 2 laps 40 ft  +Hesitation marches 15# KB 1 lap 40 ft  +Deep wall sits with hip ER 2x10  +Shuttle leg press with GTB around knees 2x10 100#    Not performed today:  Cat/cow with pelvic tilt cueing 2x 10  Bird dog x15  Gastroc slantboard stretch 3x  "30 seconds  fire hydrants RTB 2x15  lateral walking w/ x band around feet 4x40 ft  Prone hip IR/ER x20  Bridging w/ knee extensions  side planks w/ hip abduction 20  with knee extended  Clams GTB x10 10 sec holds B  Prone hip ext knee bent x15 B   Supine iso clamshell with TrA activation and pilates ring 5-10 second holds x3 minutes  +Self hip distraction mobilization using red super band lateral and long axis    s/l hip abduction x20  s/l hip circles x20   SL RDL with pallof press 2x12 R/  +3 way balance on black side bosu, fingertip assist on // bars  +cross over step up 10"   +march hold to SL RLD on blue foam x20  +ankle self mobilization x20   Standing hamstring mobilization   SLR w/ TA activation 2x12  Bridging w/ knee ext and neutral pelvis 2x12  Supine shoulder flexion w/ TA x25  Kneeling hip flexor stretch w/ banded lateral distraction x20, 3 sec holds  LTR x2 minutes  Supine TA bracing 15 x 10 sec  Supine sciatic nerve glides with ankle inversion and towel roll x20  Standing hamstring mobilization x25 on chair  Piriformis stretching  Prone multifidi activation w/ very mini hip ext x20  Supine iso ADD with TrA activation and pilates ring 5-10 second holds x3 minutes  Quadruped TA bracing 15x5 sec    Nils received manual therapy for joint mobilization of his hips for 10 minutes including:  Lateral/caudal traction in neutral and in ER bilaterally grade iv supine  Prone hip PA w/ ER grade iv  Long axis distraction in supine grade iv B  Ankle PA bilateral     Assessment   Patient tolerated treatment well today with good tolerance to exercises given. Emphasis on hip flexor strengthening with good tolerance. Initiated wall sit activity and shuttle leg presses to mimic deep squatting with emphasis on hip ER. Continue to progress exercises as tolerated by patient.     NILS is progressing well towards his goals.   Pt prognosis is Good.     Pt will continue to benefit from skilled outpatient physical therapy to " address the deficits listed in the problem list box on initial evaluation, provide pt/family education and to maximize pt's level of independence in the home and community environment.     Pt's spiritual, cultural and educational needs considered and pt agreeable to plan of care and goals.     Anticipated barriers to physical therapy: none    Goals: Short Term Goals: 3 weeks   Pt will improve his SLR measurement 15 degrees bilaterally. In progress  Pt will be able to maintain a neutral pelvis with bird dogs sets. In progress  Pt will be independent with his HEP.MET     Long Term Goals: 6 weeks   Pt will be able to sit for <1 hr without an increase in symptoms to tolerate work. In progress  Pt will be able to increase his SLR measurement 25 degrees from IE to improve functional mobility with his workouts. In progress  Pt will improve his functional limitation score to 20% for return to PLOF. In progress       Plan     Continue with established PT POC and progress per pt tolerance.      Updated Certification Period: 12/20/2022 to 03/03/2023  Recommended Treatment Plan: 2 times per week for 10 weeks: Electrical Stimulation PRN, Gait Training, Manual Therapy, Moist Heat/ Ice, Neuromuscular Re-ed, Patient Education, Therapeutic Activities, Therapeutic Exercise, and dry needling  Other Recommendations: appointment with MD for hips, possible imaging for hips    Benjy Douglas, PTA

## 2022-12-27 ENCOUNTER — CLINICAL SUPPORT (OUTPATIENT)
Dept: REHABILITATION | Facility: OTHER | Age: 37
End: 2022-12-27
Payer: COMMERCIAL

## 2022-12-27 DIAGNOSIS — R29.898 IMPAIRED FLEXIBILITY OF LOWER EXTREMITY: Primary | ICD-10-CM

## 2022-12-27 PROCEDURE — 97110 THERAPEUTIC EXERCISES: CPT | Mod: PN

## 2022-12-27 PROCEDURE — 97140 MANUAL THERAPY 1/> REGIONS: CPT | Mod: PN

## 2022-12-27 NOTE — PROGRESS NOTES
Physical Therapy Progress Note     Name: Nils Patino  Clinic Number: [id number]    Therapy Diagnosis:   Encounter Diagnosis   Name Primary?    Impaired flexibility of lower extremity Yes       Physician: Estefania Penaloza*    Visit Date: 12/27/2022    Physician Orders: PT Eval and Treat  Medical Diagnosis: Right hip pain (M25.551); Leg pain (M79.606)  Evaluation Date: 11/4/2022  Authorization Period Expiration: 12/31/2022  Plan of Care Certification Period: 11/4/2022 - 12/16/2022 --> Updated 12/20/2022 to 03/03/2023  Visit #/Visits authorized: 11/ 20 (12 visits total)  Re-assessment: due 1/20/2023   FOTO: 12/20/2022 (2/3)   PT/PTA visit: 1/6    Time In: 4:04 pm   Time Out: 4:48 pm  Total Billable Time: 44 minutes    Precautions: Standard    Subjective     Pt reports: deep hip pinch/ache at times, intermittent anterior hip pain and locking particularly on L.  He continues to report pain with deep squatting in the front of his hip. He was however, able to run about 3 miles 2 days in a row, which is new, with minimal symptoms.     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    He was compliant with home exercise program.    Response to previous treatment: no change in hip symptoms.   Functional change: resolved low back and sciatica symptoms    Pain: 1-2/10; 3/10 worst  Location: Bilateral hips R>L, anterolateral    Objective     NILS received therapeutic exercises to develop strength, endurance, flexibility, and core stabilization for 29 minutes including:  Long sitting SLR x10 B  Long sitting SLR with ABD x10 B  Supine Alt dead bug legs with RTB around feet x10 B  Quick Step ups on 10' step with SL balance x10 B  Seated Alt march with TrA activation and RTB around feet 2x 10   High knees with wall support and GTB around feet 2x 10 B      Not performed today:  Supine marching with TrA bracing 2x 10 B  Hip circles x20 R/L   SLR with eccentric lowering for 5 seconds 2x 10  "B  +monster walking green theraband around feet 2x40 ft  Lateral walks GTB 2 laps 40 ft  +Hesitation marches 15# KB 1 lap 40 ft  +Deep wall sits with hip ER 2x10  +Shuttle leg press with GTB around knees 2x10 100#  Cat/cow with pelvic tilt cueing 2x 10  Bird dog x15  Gastroc slantboard stretch 3x 30 seconds  fire hydrants RTB 2x15  lateral walking w/ x band around feet 4x40 ft  Prone hip IR/ER x20  Bridging w/ knee extensions  side planks w/ hip abduction 20  with knee extended  Clams GTB x10 10 sec holds B  Prone hip ext knee bent x15 B   Supine iso clamshell with TrA activation and pilates ring 5-10 second holds x3 minutes  +Self hip distraction mobilization using red super band lateral and long axis    s/l hip abduction x20  s/l hip circles x20   SL RDL with pallof press 2x12 R/  +3 way balance on black side bosu, fingertip assist on // bars  +cross over step up 10"   +march hold to SL RLD on blue foam x20  +ankle self mobilization x20   Standing hamstring mobilization   SLR w/ TA activation 2x12  Bridging w/ knee ext and neutral pelvis 2x12  Supine shoulder flexion w/ TA x25  Kneeling hip flexor stretch w/ banded lateral distraction x20, 3 sec holds  LTR x2 minutes  Supine TA bracing 15 x 10 sec  Supine sciatic nerve glides with ankle inversion and towel roll x20  Standing hamstring mobilization x25 on chair  Piriformis stretching  Prone multifidi activation w/ very mini hip ext x20  Supine iso ADD with TrA activation and pilates ring 5-10 second holds x3 minutes  Quadruped TA bracing 15x5 sec    BERNIE received the following manual therapy techniques: dry needling were applied to the: R hip for 15 minutes, including:  Application of TDN: Pt educated on benefits and potential side effects of dry needling. Educated pt on benefits, precautions, side effects following TDN. Educated pt to use heat following treatment sessions if pt is experiencing pain or soreness. Pt verbalized good understanding of education.  Pt " signed written consent to dry needling. Pt gave verbal consent for DN    Pt received dry needling to the below listed muscles using 60mm needles.  Adductors x3    Winding performed every 5 minutes during treatment.        Not performed today:  Lateral/caudal traction in neutral and in ER bilaterally grade iv supine  Prone hip PA w/ ER grade iv  Long axis distraction in supine grade iv B  Ankle PA bilateral     Assessment   Improved hip mobility with sitting cross legged after needling although deep ache and pinching remains on the R hip.  Continued with emphasis on hip flexor strengthening initiating some exercises focusing on hip flexion past 90 degrees as this is where pain remains with deep squatting.  Continue with strengthening and consider additional dry needling for anterior hips and adductors B as needed.     BERNIE is progressing well towards his goals.   Pt prognosis is Good.     Pt will continue to benefit from skilled outpatient physical therapy to address the deficits listed in the problem list box on initial evaluation, provide pt/family education and to maximize pt's level of independence in the home and community environment.     Pt's spiritual, cultural and educational needs considered and pt agreeable to plan of care and goals.     Anticipated barriers to physical therapy: none    Goals: Short Term Goals: 3 weeks   Pt will improve his SLR measurement 15 degrees bilaterally. In progress  Pt will be able to maintain a neutral pelvis with bird dogs sets. In progress  Pt will be independent with his HEP.MET     Long Term Goals: 6 weeks   Pt will be able to sit for <1 hr without an increase in symptoms to tolerate work. In progress  Pt will be able to increase his SLR measurement 25 degrees from IE to improve functional mobility with his workouts. In progress  Pt will improve his functional limitation score to 20% for return to PLOF. In progress       Plan     Continue with established PT POC and  progress per pt tolerance.      Updated Certification Period: 12/20/2022 to 03/03/2023  Recommended Treatment Plan: 2 times per week for 10 weeks: Electrical Stimulation PRN, Gait Training, Manual Therapy, Moist Heat/ Ice, Neuromuscular Re-ed, Patient Education, Therapeutic Activities, Therapeutic Exercise, and dry needling  Other Recommendations: appointment with MD for hips, possible imaging for hips    Christen Dodd, PT

## 2023-01-04 NOTE — PROGRESS NOTES
Physical Therapy Progress Note     Name: Nils Patnio  Clinic Number: [id number]    Therapy Diagnosis:   Encounter Diagnosis   Name Primary?    Impaired flexibility of lower extremity Yes     Physician: Estefania Penaloza*    Visit Date: 1/5/2023    Physician Orders: PT Eval and Treat  Medical Diagnosis: Right hip pain (M25.551); Leg pain (M79.606)  Evaluation Date: 11/4/2022  Authorization Period Expiration: 12/31/2023  Plan of Care Certification Period: 12/20/2022 to 03/03/2023  Visit #/Visits authorized: 1/ 20 (13 visits total)  Re-assessment: due 1/20/2023   FOTO: 12/20/2022 (2/3)   PT/PTA visit: 0/6    Time In: 4:06 pm   Time Out: 4:46 pm   Total Billable Time: 40 minutes    Precautions: Standard    Subjective     Pt reports: he did a run today and didn't really feel the hip flexors as much as he did before.  Also notes his hip was looser after last needling and he is able to get more range into some external rotation with a slightly milder pinch feeling and didn't feel it as soon as he used to.      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    He was compliant with home exercise program.    Response to previous treatment: no change in hip symptoms.   Functional change: resolved low back and sciatica symptoms    Pain: 1-2/10; 3/10 worst  Location: Bilateral hips R>L, anterolateral    Objective     NILS received therapeutic exercises to develop strength, endurance, flexibility, and core stabilization for 25 minutes including:  Supine SLR with 3# weight x15 B  Long sitting SLR x10 B  Long sitting SLR with ABD x10 B  Supine Alt dead bug legs with GTB around feet x10 B  Quick Step ups on 10' step with SL balance with 10# kettle bell x10 B  Standing hip flexion march with GTB around feet with ER x10 B  Seated Alt march with TrA activation and GTB around feet 2x 10   High knees with wall support and GTB around feet 2x 10 B      Not performed today:  Supine marching with TrA  "bracing 2x 10 B  Hip circles x20 R/L   SLR with eccentric lowering for 5 seconds 2x 10 B  +monster walking green theraband around feet 2x40 ft  Lateral walks GTB 2 laps 40 ft  +Hesitation marches 15# KB 1 lap 40 ft  +Deep wall sits with hip ER 2x10  +Shuttle leg press with GTB around knees 2x10 100#  Cat/cow with pelvic tilt cueing 2x 10  Bird dog x15  Gastroc slantboard stretch 3x 30 seconds  fire hydrants RTB 2x15  lateral walking w/ x band around feet 4x40 ft  Prone hip IR/ER x20  Bridging w/ knee extensions  side planks w/ hip abduction 20  with knee extended  Clams GTB x10 10 sec holds B  Prone hip ext knee bent x15 B   Supine iso clamshell with TrA activation and pilates ring 5-10 second holds x3 minutes  +Self hip distraction mobilization using red super band lateral and long axis    s/l hip abduction x20  s/l hip circles x20   SL RDL with pallof press 2x12 R/  +3 way balance on black side bosu, fingertip assist on // bars  +cross over step up 10"   +march hold to SL RLD on blue foam x20  +ankle self mobilization x20   Standing hamstring mobilization   SLR w/ TA activation 2x12  Bridging w/ knee ext and neutral pelvis 2x12  Supine shoulder flexion w/ TA x25  Kneeling hip flexor stretch w/ banded lateral distraction x20, 3 sec holds  LTR x2 minutes  Supine TA bracing 15 x 10 sec  Supine sciatic nerve glides with ankle inversion and towel roll x20  Standing hamstring mobilization x25 on chair  Piriformis stretching  Prone multifidi activation w/ very mini hip ext x20  Supine iso ADD with TrA activation and pilates ring 5-10 second holds x3 minutes  Quadruped TA bracing 15x5 sec    BERNIE received the following manual therapy techniques: dry needling were applied to the: B hips for 15 minutes, including:  Application of TDN: Pt educated on benefits and potential side effects of dry needling. Educated pt on benefits, precautions, side effects following TDN. Educated pt to use heat following treatment sessions if " pt is experiencing pain or soreness. Pt verbalized good understanding of education.  Pt signed written consent to dry needling. Pt gave verbal consent for DN    Pt received dry needling to the below listed muscles using 60mm needles.  B Adductors x3    Winding performed every 5 minutes during treatment.        Not performed today:  Lateral/caudal traction in neutral and in ER bilaterally grade iv supine  Prone hip PA w/ ER grade iv  Long axis distraction in supine grade iv B  Ankle PA bilateral     Assessment     Patient tolerated treatment well today.  Continued with hip flexor strengthening and needling, resuming needling on the L hip as well today as patient notes improved symptoms after last visit.  Consider e-stim, particularly on the R if symptoms continue to decrease.  Good soft tissue response to dry needling evident by increased grasp with unilateral winding at all insertion points. Winding performed every 5 minutes during treatment. No adverse effects following treatment.     BERNIE is progressing well towards his goals.   Pt prognosis is Good.     Pt will continue to benefit from skilled outpatient physical therapy to address the deficits listed in the problem list box on initial evaluation, provide pt/family education and to maximize pt's level of independence in the home and community environment.     Pt's spiritual, cultural and educational needs considered and pt agreeable to plan of care and goals.     Anticipated barriers to physical therapy: none    Goals: Short Term Goals: 3 weeks   Pt will improve his SLR measurement 15 degrees bilaterally. In progress  Pt will be able to maintain a neutral pelvis with bird dogs sets. In progress  Pt will be independent with his HEP.MET     Long Term Goals: 6 weeks   Pt will be able to sit for <1 hr without an increase in symptoms to tolerate work. In progress  Pt will be able to increase his SLR measurement 25 degrees from IE to improve functional mobility with  his workouts. In progress  Pt will improve his functional limitation score to 20% for return to PLOF. In progress       Plan     Continue with established PT POC and progress per pt tolerance.      Updated Certification Period: 12/20/2022 to 03/03/2023  Recommended Treatment Plan: 2 times per week for 10 weeks: Electrical Stimulation PRN, Gait Training, Manual Therapy, Moist Heat/ Ice, Neuromuscular Re-ed, Patient Education, Therapeutic Activities, Therapeutic Exercise, and dry needling  Other Recommendations: appointment with MD for hips, possible imaging for hips    Christen Dodd, PT

## 2023-01-05 ENCOUNTER — CLINICAL SUPPORT (OUTPATIENT)
Dept: REHABILITATION | Facility: OTHER | Age: 38
End: 2023-01-05
Payer: COMMERCIAL

## 2023-01-05 DIAGNOSIS — R29.898 IMPAIRED FLEXIBILITY OF LOWER EXTREMITY: Primary | ICD-10-CM

## 2023-01-05 PROCEDURE — 97140 MANUAL THERAPY 1/> REGIONS: CPT | Mod: PN

## 2023-01-05 PROCEDURE — 97110 THERAPEUTIC EXERCISES: CPT | Mod: PN

## 2023-01-09 ENCOUNTER — CLINICAL SUPPORT (OUTPATIENT)
Dept: REHABILITATION | Facility: OTHER | Age: 38
End: 2023-01-09
Payer: COMMERCIAL

## 2023-01-09 DIAGNOSIS — R29.898 IMPAIRED FLEXIBILITY OF LOWER EXTREMITY: Primary | ICD-10-CM

## 2023-01-09 PROCEDURE — 97110 THERAPEUTIC EXERCISES: CPT | Mod: PN

## 2023-01-09 PROCEDURE — 97140 MANUAL THERAPY 1/> REGIONS: CPT | Mod: PN

## 2023-01-09 NOTE — PROGRESS NOTES
"Physical Therapy Progress Note     Name: Nils Patino  Clinic Number: [id number]    Therapy Diagnosis:   Encounter Diagnosis   Name Primary?    Impaired flexibility of lower extremity Yes        Physician: Estefania Penaloza*    Visit Date: 1/9/2023    Physician Orders: PT Eval and Treat  Medical Diagnosis: Right hip pain (M25.551); Leg pain (M79.606)  Evaluation Date: 11/4/2022  Authorization Period Expiration: 12/31/2023  Plan of Care Certification Period: 12/20/2022 to 03/03/2023  Visit #/Visits authorized: 1/ 20 (15 visits total)  Re-assessment: due 1/20/2023   FOTO: 12/20/2022 (2/3)   PT/PTA visit: 0/6    Time In: 1:45 pm  Time Out: 2:30 pm  Total Billable Time: 40 minutes    Precautions: Standard    Subjective     Pt reports:he is feeling about the same. He has a tele health visit with his MD later on today to discuss possible imaging    Patient has a sedentary job, 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    He was compliant with home exercise program.    Response to previous treatment: no change in hip symptoms.   Functional change: resolved low back and sciatica symptoms    Pain: 1-2/10; 3/10 worst  Location: Bilateral hips R>L, anterolateral    Objective     NILS received therapeutic exercises to develop strength, endurance, flexibility, and core stabilization for 15 minutes including:  +Adductor MET 10x5"  +Supine hip extension w/ pilates ring and SLR w/ opposite leg 3# 3x12  +Standing isometric hip flexion starting at 90 degrees 2x12x3" 3# (foot elevated on steps)          Supine SLR with 3# weight x15 B  Long sitting SLR x10 B  Long sitting SLR with ABD x10 B  Supine Alt dead bug legs with GTB around feet x10 B  Quick Step ups on 10' step with SL balance with 10# kettle bell x10 B  Standing hip flexion march with GTB around feet with ER x10 B  Seated Alt march with TrA activation and GTB around feet 2x 10   High knees with wall support and GTB around feet 2x 10 B      Not performed " "today:  Supine marching with TrA bracing 2x 10 B  Hip circles x20 R/L   SLR with eccentric lowering for 5 seconds 2x 10 B  +monster walking green theraband around feet 2x40 ft  Lateral walks GTB 2 laps 40 ft  +Hesitation marches 15# KB 1 lap 40 ft  +Deep wall sits with hip ER 2x10  +Shuttle leg press with GTB around knees 2x10 100#  Cat/cow with pelvic tilt cueing 2x 10  Bird dog x15  Gastroc slantboard stretch 3x 30 seconds  fire hydrants RTB 2x15  lateral walking w/ x band around feet 4x40 ft  Prone hip IR/ER x20  Bridging w/ knee extensions  side planks w/ hip abduction 20  with knee extended  Clams GTB x10 10 sec holds B  Prone hip ext knee bent x15 B   Supine iso clamshell with TrA activation and pilates ring 5-10 second holds x3 minutes  +Self hip distraction mobilization using red super band lateral and long axis    s/l hip abduction x20  s/l hip circles x20   SL RDL with pallof press 2x12 R/  +3 way balance on black side bosu, fingertip assist on // bars  +cross over step up 10"   +march hold to SL RLD on blue foam x20  +ankle self mobilization x20   Standing hamstring mobilization   SLR w/ TA activation 2x12  Bridging w/ knee ext and neutral pelvis 2x12  Supine shoulder flexion w/ TA x25  Kneeling hip flexor stretch w/ banded lateral distraction x20, 3 sec holds  LTR x2 minutes  Supine TA bracing 15 x 10 sec  Supine sciatic nerve glides with ankle inversion and towel roll x20  Standing hamstring mobilization x25 on chair  Piriformis stretching  Prone multifidi activation w/ very mini hip ext x20  Supine iso ADD with TrA activation and pilates ring 5-10 second holds x3 minutes  Quadruped TA bracing 15x5 sec    BERNIE received the following manual therapy techniques: dry needling were applied to the: B hips for 30 minutes, including:  Application of TDN: Pt educated on benefits and potential side effects of dry needling. Educated pt on benefits, precautions, side effects following TDN. Educated pt to use heat " following treatment sessions if pt is experiencing pain or soreness. Pt verbalized good understanding of education.  Pt signed written consent to dry needling. Pt gave verbal consent for DN    Pt received dry needling to the below listed muscles using 60mm needles.  B Adductors x3  E-stim was applied via 2 channels at 2-4 Hz and 3-6 mA to tolerance x 15 minutes B  Winding performed every 5 minutes during treatment.        Not performed today:  Lateral/caudal traction in neutral and in ER bilaterally grade iv supine  Prone hip PA w/ ER grade iv  Long axis distraction in supine grade iv B  Ankle PA bilateral     Assessment   Added e-stim today needles this visit. Educated pt on use of heat for soreness for next 1-2 days. Pt verbalized understanding. Plan to continue needling and progress exercises per pts tolerance.       BERNIE is progressing well towards his goals.   Pt prognosis is Good.     Pt will continue to benefit from skilled outpatient physical therapy to address the deficits listed in the problem list box on initial evaluation, provide pt/family education and to maximize pt's level of independence in the home and community environment.     Pt's spiritual, cultural and educational needs considered and pt agreeable to plan of care and goals.     Anticipated barriers to physical therapy: none    Goals: Short Term Goals: 3 weeks   Pt will improve his SLR measurement 15 degrees bilaterally. In progress  Pt will be able to maintain a neutral pelvis with bird dogs sets. In progress  Pt will be independent with his HEP.MET     Long Term Goals: 6 weeks   Pt will be able to sit for <1 hr without an increase in symptoms to tolerate work. In progress  Pt will be able to increase his SLR measurement 25 degrees from IE to improve functional mobility with his workouts. In progress  Pt will improve his functional limitation score to 20% for return to PLOF. In progress       Plan     Continue with established PT POC and  progress per pt tolerance.      Updated Certification Period: 12/20/2022 to 03/03/2023  Recommended Treatment Plan: 2 times per week for 10 weeks: Electrical Stimulation PRN, Gait Training, Manual Therapy, Moist Heat/ Ice, Neuromuscular Re-ed, Patient Education, Therapeutic Activities, Therapeutic Exercise, and dry needling  Other Recommendations: appointment with MD for hips, possible imaging for hips    Breunica Lina, PT

## 2023-01-11 ENCOUNTER — CLINICAL SUPPORT (OUTPATIENT)
Dept: REHABILITATION | Facility: OTHER | Age: 38
End: 2023-01-11
Payer: COMMERCIAL

## 2023-01-11 DIAGNOSIS — R29.898 IMPAIRED FLEXIBILITY OF LOWER EXTREMITY: Primary | ICD-10-CM

## 2023-01-11 PROCEDURE — 97110 THERAPEUTIC EXERCISES: CPT | Mod: PN

## 2023-01-11 NOTE — PROGRESS NOTES
Physical Therapy Progress Note     Name: Nils Patino  Clinic Number: [id number]    Therapy Diagnosis:   Encounter Diagnosis   Name Primary?    Impaired flexibility of lower extremity Yes     Physician: Estefania Penaloza*    Visit Date: 1/11/2023    Physician Orders: PT Eval and Treat  Medical Diagnosis: Right hip pain (M25.551); Leg pain (M79.606)  Evaluation Date: 11/4/2022  Authorization Period Expiration: 12/31/2023  Plan of Care Certification Period: 12/20/2022 to 03/03/2023  Visit #/Visits authorized: 2/ 20 (16 visits total)  Re-assessment: due 1/20/2023   FOTO: 12/20/2022 (2/3)   PT/PTA visit: 0/6    Time In: 1:05 pm  Time Out: 1:50 pm   Total Billable Time: 40 minutes    Precautions: Standard    Subjective     Pt reports:the hip flexor pain has resolved although he is now feeling the lateral hip pinching on the L again, particularly with ER/ABD.  He has an appointment next week with the MD who is probably going to be doing more imaging.      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    He was compliant with home exercise program.    Response to previous treatment: no change in hip symptoms.   Functional change: resolved low back and sciatica symptoms    Pain: 1-2/10; 3/10 worst  Location: Bilateral hips R>L, anterolateral    Objective     NILS received therapeutic exercises to develop strength, endurance, flexibility, and core stabilization for 40 minutes including:  Long sitting SLR 2x 10 B  Long sitting SLR with ABD 2x 10 B  Supine Alt dead bug legs with BTB around feet 2x 10 B  Seated Alt march with TrA activation and BTB around feet 2x 10  Inchworms 2x 20 ft  Primal push up with knee flexion/extension x10 B  Walking lunge with twist and yellow weighted ball 2x 30 ft  Dead lift with small red super band 2x 10      Not performed today:  Supine SLR with 3# weight x15 B  Quick Step ups on 10' step with SL balance with 10# kettle bell x10 B  Standing hip flexion  "march with GTB around feet with ER x10 B  High knees with wall support and GTB around feet 2x 10 B  Supine marching with TrA bracing 2x 10 B  Hip circles x20 R/L   SLR with eccentric lowering for 5 seconds 2x 10 B  +monster walking green theraband around feet 2x40 ft  Lateral walks GTB 2 laps 40 ft  +Hesitation marches 15# KB 1 lap 40 ft  +Deep wall sits with hip ER 2x10  +Shuttle leg press with GTB around knees 2x10 100#  Cat/cow with pelvic tilt cueing 2x 10  Bird dog x15  Gastroc slantboard stretch 3x 30 seconds  fire hydrants RTB 2x15  lateral walking w/ x band around feet 4x40 ft  Prone hip IR/ER x20  Bridging w/ knee extensions  side planks w/ hip abduction 20  with knee extended  Clams GTB x10 10 sec holds B  Prone hip ext knee bent x15 B   Supine iso clamshell with TrA activation and pilates ring 5-10 second holds x3 minutes  +Self hip distraction mobilization using red super band lateral and long axis    s/l hip abduction x20  s/l hip circles x20   SL RDL with pallof press 2x12 R/  +3 way balance on black side bosu, fingertip assist on // bars  +cross over step up 10"   +march hold to SL RLD on blue foam x20  +ankle self mobilization x20   Standing hamstring mobilization   SLR w/ TA activation 2x12  Bridging w/ knee ext and neutral pelvis 2x12  Supine shoulder flexion w/ TA x25  Kneeling hip flexor stretch w/ banded lateral distraction x20, 3 sec holds  LTR x2 minutes  Supine TA bracing 15 x 10 sec  Supine sciatic nerve glides with ankle inversion and towel roll x20  Standing hamstring mobilization x25 on chair  Piriformis stretching  Prone multifidi activation w/ very mini hip ext x20  Supine iso ADD with TrA activation and pilates ring 5-10 second holds x3 minutes  Quadruped TA bracing 15x5 sec    BERNIE received the following manual therapy techniques: dry needling were applied to the: B hips for 00 minutes, including:  Application of TDN: Pt educated on benefits and potential side effects of dry " needling. Educated pt on benefits, precautions, side effects following TDN. Educated pt to use heat following treatment sessions if pt is experiencing pain or soreness. Pt verbalized good understanding of education.  Pt signed written consent to dry needling. Pt gave verbal consent for DN    Pt received dry needling to the below listed muscles using 60mm needles.  B Adductors x3  E-stim was applied via 2 channels at 2-4 Hz and 3-6 mA to tolerance x 15 minutes B  Winding performed every 5 minutes during treatment.        Not performed today:  Lateral/caudal traction in neutral and in ER bilaterally grade iv supine  Prone hip PA w/ ER grade iv  Long axis distraction in supine grade iv B  Ankle PA bilateral    Home Exercises Provided and Patient Education Provided      Education provided:   - holding off with needling  - exercise form and purpose    Written Home Exercises Provided: yes.  Exercises were reviewed and BERNIE was able to demonstrate them prior to the end of the session.  BERNIE demonstrated good  understanding of the education provided.      See EMR under Patient Instructions for exercises provided.      Assessment     Held off with needling as patient denies presence of any of the anterior/hip flexor pain that he previously noted.  He does however report return of lateral hip pain on L side, particularly with weight shifting.  Continued with hip flexor strengthening and resumed posterior chain strengthening with dead lifts. No increase in pain with these exercises today.  Continue with general strengthening and resume dry needling as needed for pain relief.     BERNIE is progressing well towards his goals.   Pt prognosis is Good.     Pt will continue to benefit from skilled outpatient physical therapy to address the deficits listed in the problem list box on initial evaluation, provide pt/family education and to maximize pt's level of independence in the home and community environment.     Pt's spiritual,  cultural and educational needs considered and pt agreeable to plan of care and goals.     Anticipated barriers to physical therapy: none    Goals: Short Term Goals: 3 weeks   Pt will improve his SLR measurement 15 degrees bilaterally. In progress  Pt will be able to maintain a neutral pelvis with bird dogs sets. In progress  Pt will be independent with his HEP.MET     Long Term Goals: 6 weeks   Pt will be able to sit for <1 hr without an increase in symptoms to tolerate work. In progress  Pt will be able to increase his SLR measurement 25 degrees from IE to improve functional mobility with his workouts. In progress  Pt will improve his functional limitation score to 20% for return to PLOF. In progress       Plan     Continue with established PT POC and progress per pt tolerance.      Updated Certification Period: 12/20/2022 to 03/03/2023  Recommended Treatment Plan: 2 times per week for 10 weeks: Electrical Stimulation PRN, Gait Training, Manual Therapy, Moist Heat/ Ice, Neuromuscular Re-ed, Patient Education, Therapeutic Activities, Therapeutic Exercise, and dry needling  Other Recommendations: appointment with MD for hips, possible imaging for hips    Christen Dodd, PT

## 2023-01-20 ENCOUNTER — DOCUMENTATION ONLY (OUTPATIENT)
Dept: REHABILITATION | Facility: OTHER | Age: 38
End: 2023-01-20
Payer: COMMERCIAL

## 2023-01-20 NOTE — PROGRESS NOTES
Patient failed to appear for scheduled PT appointment today at 1:45 pm without prior notification or cancellation.    Christen Dodd, PT, DPT  1/20/2023

## 2023-10-05 ENCOUNTER — OFFICE VISIT (OUTPATIENT)
Dept: DERMATOLOGY | Facility: CLINIC | Age: 38
End: 2023-10-05
Payer: COMMERCIAL

## 2023-10-05 DIAGNOSIS — L81.4 LENTIGINES: ICD-10-CM

## 2023-10-05 DIAGNOSIS — D18.01 CHERRY ANGIOMA: ICD-10-CM

## 2023-10-05 DIAGNOSIS — D22.9 MULTIPLE BENIGN NEVI: ICD-10-CM

## 2023-10-05 DIAGNOSIS — D23.9 DERMATOFIBROMA: Primary | ICD-10-CM

## 2023-10-05 DIAGNOSIS — Z12.83 SCREENING EXAM FOR SKIN CANCER: ICD-10-CM

## 2023-10-05 PROCEDURE — 99999 PR PBB SHADOW E&M-EST. PATIENT-LVL II: CPT | Mod: PBBFAC,,, | Performed by: STUDENT IN AN ORGANIZED HEALTH CARE EDUCATION/TRAINING PROGRAM

## 2023-10-05 PROCEDURE — 99999 PR PBB SHADOW E&M-EST. PATIENT-LVL II: ICD-10-PCS | Mod: PBBFAC,,, | Performed by: STUDENT IN AN ORGANIZED HEALTH CARE EDUCATION/TRAINING PROGRAM

## 2023-10-05 PROCEDURE — 99203 PR OFFICE/OUTPT VISIT, NEW, LEVL III, 30-44 MIN: ICD-10-PCS | Mod: S$PBB,,, | Performed by: STUDENT IN AN ORGANIZED HEALTH CARE EDUCATION/TRAINING PROGRAM

## 2023-10-05 PROCEDURE — 99203 OFFICE O/P NEW LOW 30 MIN: CPT | Mod: S$PBB,,, | Performed by: STUDENT IN AN ORGANIZED HEALTH CARE EDUCATION/TRAINING PROGRAM

## 2023-10-05 NOTE — PROGRESS NOTES
Subjective:      Patient ID:  Nils Patino is a 38 y.o. male who presents for   Chief Complaint   Patient presents with    Skin Check     TBSE     Nils Patino is a 38 y.o. male who presents for: FBSE screening exam for skin cancer.    New patient    The patient has the following lesions of concern:  Location: L leg  Duration: 6 mos - 1 yr  Symptoms: started out red/irritated, now its darker  Relieving factors/Previous treatments: n/a    Pertinent history:  History of blistering sunburns: No  History of tanning bed use: No  Immunosuppressing medications or conditions: No  Family history of melanoma: No  Personal history of mole removal: No  Personal history of skin cancer: No         Review of Systems   Skin:  Positive for activity-related sunscreen use. Negative for daily sunscreen use and recent sunburn.   Hematologic/Lymphatic: Does not bruise/bleed easily.       Objective:   Physical Exam   Constitutional: He appears well-developed and well-nourished. No distress.   Neurological: He is alert and oriented to person, place, and time. He is not disoriented.   Psychiatric: He has a normal mood and affect.   Skin:   Areas Examined (abnormalities noted in diagram):   Scalp / Hair Palpated and Inspected  Head / Face Inspection Performed  Neck Inspection Performed  Chest / Axilla Inspection Performed  Abdomen Inspection Performed  Back Inspection Performed  RUE Inspected  LUE Inspection Performed  RLE Inspected  LLE Inspection Performed  Nails and Digits Inspection Performed            Diagram Legend     Erythematous scaling macule/papule c/w actinic keratosis       Vascular papule c/w angioma      Pigmented verrucoid papule/plaque c/w seborrheic keratosis      Yellow umbilicated papule c/w sebaceous hyperplasia      Irregularly shaped tan macule c/w lentigo     1-2 mm smooth white papules consistent with Milia      Movable subcutaneous cyst with punctum c/w epidermal inclusion cyst      Subcutaneous movable  cyst c/w pilar cyst      Firm pink to brown papule c/w dermatofibroma      Pedunculated fleshy papule(s) c/w skin tag(s)      Evenly pigmented macule c/w junctional nevus     Mildly variegated pigmented, slightly irregular-bordered macule c/w mildly atypical nevus      Flesh colored to evenly pigmented papule c/w intradermal nevus       Pink pearly papule/plaque c/w basal cell carcinoma      Erythematous hyperkeratotic cursted plaque c/w SCC      Surgical scar with no sign of skin cancer recurrence      Open and closed comedones      Inflammatory papules and pustules      Verrucoid papule consistent consistent with wart     Erythematous eczematous patches and plaques     Dystrophic onycholytic nail with subungual debris c/w onychomycosis     Umbilicated papule    Erythematous-base heme-crusted tan verrucoid plaque consistent with inflamed seborrheic keratosis     Erythematous Silvery Scaling Plaque c/w Psoriasis     See annotation      Assessment / Plan:        Dermatofibroma  Often after bug bite or trauma, left medial thigh, reassurance provided  Can remove if symptomatic causing pain or itching    Cherry angioma  - This is a benign vascular lesion. Reassurance given. No treatment required.     Lentigines  - Reassurance on benign nature, encouraged sun protection    Multiple benign nevi  Examined with dermoscopy  - Reassurance on benign nature    Screening exam for skin cancer  Examined areas noted in physical exam and pertinent findings above    I discussed the importance of sun protection with the patient. Recommend daily use of SPF 30+ physical or mineral sunscreen (OTC drug), apply 15 minutes before going outdoors and reapply every 2 hours.     I reviewed the ABCDE's of melanoma, ugly duckling sign and importance of monthly self-exams in mirror.

## 2023-10-17 ENCOUNTER — CLINICAL SUPPORT (OUTPATIENT)
Dept: REHABILITATION | Facility: OTHER | Age: 38
End: 2023-10-17
Payer: COMMERCIAL

## 2023-10-17 DIAGNOSIS — M25.651 DECREASED RANGE OF MOTION OF BOTH HIPS: ICD-10-CM

## 2023-10-17 DIAGNOSIS — M25.652 DECREASED RANGE OF MOTION OF BOTH HIPS: ICD-10-CM

## 2023-10-17 DIAGNOSIS — R29.898 DECREASED STRENGTH OF LOWER EXTREMITY: ICD-10-CM

## 2023-10-17 PROBLEM — M25.659 DECREASED RANGE OF MOTION OF HIP: Status: ACTIVE | Noted: 2023-10-17

## 2023-10-17 PROCEDURE — 97161 PT EVAL LOW COMPLEX 20 MIN: CPT | Mod: PN

## 2023-10-17 PROCEDURE — 97112 NEUROMUSCULAR REEDUCATION: CPT | Mod: PN

## 2023-10-17 NOTE — PLAN OF CARE
[facility]   Physical Therapy Initial Evaluation      Name: Nils Patino  [facility] Number: [id number]    Therapy Diagnosis:   Encounter Diagnoses   Name Primary?    Decreased range of motion of both hips     Decreased strength of lower extremity         Physician: Jerome Villela MD    Physician Orders: PT Eval and Treat   Medical Diagnosis from Referral:   M25.561 (ICD-10-CM) - Right knee pain   M25.562 (ICD-10-CM) - Left knee pain     Evaluation Date: 10/17/2023  Authorization Period Expiration: 12/31/2023  Plan of Care Expiration: 11/17/2023  Progress Note Due: 11/17/2023  Visit # / Visits authorized: 1/ 1   FOTO: 0/ 3    Precautions: Standard     Time In: 9:07  Time Out: 10:00  Total Billable Time: 53 minutes    Subjective     Date of onset: 2 months    History of current condition - Nils reports: he has been having bilateral knee pain R>L for 2 months. He noticed the pain started when he increased weights in the gym with dead lifts and squats. He also notices that he has pain with partner dancing, but that has been consent. He has the pain later in the day after having performed his workouts. He has since stopped loading his knees with weight and is feeling better. He has a history of bilateral hip pain that he has been bothering him for years. His pain is above and below his knee caps. He ran 2 miles yesterday and could feel it some then. No severe pain. No numbness or tingling.     Falls: No    Imaging: none: recently    Prior Therapy: Yes  Social History:  lives alone  Occupation: Desk work  Prior Level of Function: Pt was able to perform all ADL's and activities without exercise  Current Level of Function: Pt now has pain with squats and dead lifts    Pain:  Current 0/10, worst 3/10, best 0/10   Location: right, bilateral knee    Description: Aching  Aggravating Factors: squats, dead lifts   Easing Factors: rest    Patients goals: Pt would like to exercise without pain      Medical History:   Past Medical History:   Diagnosis Date    Bulging lumbar disc     COVID 2021       Surgical History:   Nils Patino  has no past surgical history on file.    Medications:   Nils has a current medication list which includes the following prescription(s): ibuprofen.    Allergies:   Review of patient's allergies indicates:  No Known Allergies     Objective        Functional tests:   DL squat: Wide stance with excessive anterior translation of knees   SL: Bilateral knee focused mini squat     Range of Motion (Passive):   Knee Right  Left    Flexion 142 145   Extension +1 +1       Hip Range of Motion:   Right Passive Left Passive   Flexion 85 85   Abduction 35 35   Extension 5 5   Ext. Rotation 25 40   Int. Rotation 20 0       Lower Extremity Strength  Right LE  Left LE    Quadriceps: 4+/5 Quadriceps: 4+/5   Hamstrings: 4+/5 Hamstrings: 4+/5   Hip extension:  3+/5 Hip extension: 3+/5   PGM: 3-/5 PGM:  3/5   Hip ER:  4/5 Hip ER:  4/5   Hip IR: 4+/5 Hip IR: 4+/5       Joint Mobility: Slight hypomobile patellar inferior glide, Hypomobile posterior glide of femur       Intake Outcome Measure for FOTO Knee Survey    Therapist reviewed FOTO scores for Nils Patino on 10/17/2023.   FOTO report - see Media section or FOTO account episode details.    Intake Score: 59%         Treatment     Total Treatment time (time-based codes) separate from Evaluation: 24 minutes     Nils received the treatments listed below:      neuromuscular re-education activities to improve: Coordination, Kinesthetic, Sense, and Proprioception for 24 minutes. The following activities were included:  Hand heel rocks 20x  Seated Hip ER/IR 2x10 each  Supine SL bridge 2x10  Side lying clams with mod plank 2x10      Patient Education and Home Exercises     Education provided:   - HEP  - Role of PT    Written Home Exercises Provided: yes. Exercises were reviewed and Nils was able to demonstrate them prior to the end of the session.   Nils demonstrated good  understanding of the education provided. See EMR under Patient Instructions for exercises provided during therapy sessions.    Assessment     Nils is a 38 y.o. male referred to outpatient Physical Therapy with a medical diagnosis of   M25.561 (ICD-10-CM) - Right knee pain   M25.562 (ICD-10-CM) - Left knee pain   . Patient presents with decreased hip range of motion, poor movement coordination, decreased hip strength and hypomobile patellar glide. His symptoms are most likely due to his more hip range of motion, which is making him compensate with a knee strategy. He demonstrated HEP during the session and tolerated with well without increase in pain. Pt has to leave [facility] in the next month.     Patient prognosis is Good.   Patient will benefit from skilled outpatient Physical Therapy to address the deficits stated above and in the chart below, provide patient /family education, and to maximize patientt's level of independence.     Plan of care discussed with patient: Yes  Patient's spiritual, cultural and educational needs considered and patient is agreeable to the plan of care and goals as stated below:     Anticipated Barriers for therapy: Pt will be leaving the [facility] in November     Medical Necessity is demonstrated by the following  History  Co-morbidities and personal factors that may impact the plan of care [x] LOW: no personal factors / co-morbidities  [] MODERATE: 1-2 personal factors / co-morbidities  [] HIGH: 3+ personal factors / co-morbidities    Moderate / High Support Documentation:   Co-morbidities affecting plan of care:     Personal Factors:   no deficits     Examination  Body Structures and Functions, activity limitations and participation restrictions that may impact the plan of care [x] LOW: addressing 1-2 elements  [] MODERATE: 3+ elements  [] HIGH: 4+ elements (please support below)    Moderate / High Support Documentation:      Clinical Presentation [x] LOW:  stable  [] MODERATE: Evolving  [] HIGH: Unstable     Decision Making/ Complexity Score: low       Goals:  Short Term Goals:  2 weeks  1.Report decreased right knee pain  < / =  1/10  to increase tolerance for squatting, dead lifting, ADL's  2. Increase ROM by 10 degrees where limited in order to perform ADLs without difficulty.  3. Increase strength by 1/3 MMT grade in bilateral lower extremities to increase tolerance for ADL and work activities.  4. Pt to tolerate HEP to improve ROM and independence with ADL's    Long Term Goals: 4 weeks  1.Report decreased right knee pain < / = 0/10  to increase tolerance for squatting, dead lifting, ADL's  2.Patient goal: Pt would like to squat pain free  3.Increase strength to 4+/5 in bilateral lower extremities to increase tolerance for ADL and work activities.  4. Pt will report FOTO at 79 to demonstrate increase in LE function with every day tasks.      Plan     Plan of care Certification: 10/17/2023 to 11/17/2023.    Outpatient Physical Therapy 1-2 times weekly for 4 weeks to include the following interventions: Gait Training, Manual Therapy, Neuromuscular Re-ed, Patient Education, Therapeutic Activities, and Therapeutic Exercise.     Joao Banks, PT, DPT        Physician's Signature: _________________________________________ Date: ________________

## 2023-10-25 ENCOUNTER — CLINICAL SUPPORT (OUTPATIENT)
Dept: REHABILITATION | Facility: OTHER | Age: 38
End: 2023-10-25
Payer: COMMERCIAL

## 2023-10-25 DIAGNOSIS — R29.898 DECREASED STRENGTH OF LOWER EXTREMITY: ICD-10-CM

## 2023-10-25 DIAGNOSIS — M25.651 DECREASED RANGE OF MOTION OF BOTH HIPS: Primary | ICD-10-CM

## 2023-10-25 DIAGNOSIS — M25.652 DECREASED RANGE OF MOTION OF BOTH HIPS: Primary | ICD-10-CM

## 2023-10-25 PROCEDURE — 97140 MANUAL THERAPY 1/> REGIONS: CPT | Mod: PN

## 2023-10-25 PROCEDURE — 97112 NEUROMUSCULAR REEDUCATION: CPT | Mod: PN

## 2023-10-25 PROCEDURE — 97530 THERAPEUTIC ACTIVITIES: CPT | Mod: PN

## 2023-10-25 NOTE — PROGRESS NOTES
[facility]   Physical Therapy Treatment Note     Name: Nils Patino  Clinic Number: [id number]    Therapy Diagnosis:   Encounter Diagnoses   Name Primary?    Decreased range of motion of both hips Yes    Decreased strength of lower extremity      Physician: Estefania Penaloza*    Visit Date: 10/25/2023    Physician Orders: PT Eval and Treat   Medical Diagnosis from Referral:   M25.561 (ICD-10-CM) - Right knee pain   M25.562 (ICD-10-CM) - Left knee pain      Evaluation Date: 10/17/2023  Authorization Period Expiration: 12/31/2023  Plan of Care Expiration: 11/17/2023  Progress Note Due: 11/17/2023  Visit # / Visits authorized: 1/ 20   FOTO: 0/ 3     Precautions: Standard      Time In: 10:02  Time Out: 11:00  Total Billable Time: 58 minutes      SUBJECTIVE     Pt reports: He was able to dance this weekend without much pain.Has no squatted    He was compliant with home exercise program.  Response to previous treatment: Soreness  Functional change: Progressing     Pain: 0/10  Location: bilateral knees     OBJECTIVE     Objective Measures updated at progress report unless specified.     Treatment       Nils received the treatments listed below:        Manual therapy techniques: Joint mobilizations were applied to the: hips for 13 minutes, including:  Posterior glide of femur  Lateral distraction with IR  Long axis distraction Grade V    Therapeutic activities to improve functional performance for 19  minutes, including:  Seated RDL 20x  Sit to stand RDL 20x  Standing RDL 20X  Squat with dowel 3x10    Neuromuscular re-education activities to improve: Coordination, Kinesthetic, Sense, and Proprioception for 26 minutes. The following activities were included:  Hand heel rocks 20x - power band  Seated Hip ER/IR 2x10 each  Stool ER/IR 30x  Supine SL bridge 2x10  Side lying clams with mod plank 2x10         Patient Education and Home Exercises     Home Exercises Provided and Patient Education  Provided     Education provided:   - HEP    Written Home Exercises Provided: Patient instructed to cont prior HEP. Exercises were reviewed and Nils was able to demonstrate them prior to the end of the session.  Nils demonstrated good  understanding of the education provided. See EMR under Patient Instructions for exercises provided during therapy sessions    ASSESSMENT     Nils was progressed with hip mobility exercises and hip strengthening exercises. He was able to squat pain free after the session. Plan to continue to emphasize hip mobility and hip strength.     Nils Is progressing well towards his goals.     Pt prognosis is Good.     Pt will continue to benefit from skilled outpatient physical therapy to address the deficits listed in the problem list box on initial evaluation, provide pt/family education and to maximize pt's level of independence in the home and community environment.     Pt's spiritual, cultural and educational needs considered and pt agreeable to plan of care and goals.     Anticipated Barriers for therapy: [place]     Goals:  Short Term Goals:  2 weeks (Progressing, not me)  1.Report decreased right knee pain  < / =  1/10  to increase tolerance for squatting, dead lifting, ADL's  2. Increase ROM by 10 degrees where limited in order to perform ADLs without difficulty.  3. Increase strength by 1/3 MMT grade in bilateral lower extremities to increase tolerance for ADL and work activities.  4. Pt to tolerate HEP to improve ROM and independence with ADL's     Long Term Goals: 4 weeks (Progressing, not met)  1.Report decreased right knee pain < / = 0/10  to increase tolerance for squatting, dead lifting, ADL's  2.Patient goal: Pt would like to squat pain free  3.Increase strength to 4+/5 in bilateral lower extremities to increase tolerance for ADL and work activities.  4. Pt will report FOTO at 79 to demonstrate increase in LE function with every day tasks.        Plan      Plan of care Certification: 10/17/2023 to 11/17/2023.    Continue with PT plan of care with emphasis on hip mobility    Joao Bansk, PT, DPT

## 2023-10-27 ENCOUNTER — CLINICAL SUPPORT (OUTPATIENT)
Dept: REHABILITATION | Facility: OTHER | Age: 38
End: 2023-10-27
Payer: COMMERCIAL

## 2023-10-27 DIAGNOSIS — M25.651 DECREASED RANGE OF MOTION OF BOTH HIPS: Primary | ICD-10-CM

## 2023-10-27 DIAGNOSIS — M25.652 DECREASED RANGE OF MOTION OF BOTH HIPS: Primary | ICD-10-CM

## 2023-10-27 DIAGNOSIS — R29.898 DECREASED STRENGTH OF LOWER EXTREMITY: ICD-10-CM

## 2023-10-27 PROCEDURE — 97112 NEUROMUSCULAR REEDUCATION: CPT | Mod: PN

## 2023-10-27 PROCEDURE — 97140 MANUAL THERAPY 1/> REGIONS: CPT | Mod: PN

## 2023-10-27 PROCEDURE — 97530 THERAPEUTIC ACTIVITIES: CPT | Mod: PN

## 2023-10-30 ENCOUNTER — CLINICAL SUPPORT (OUTPATIENT)
Dept: REHABILITATION | Facility: OTHER | Age: 38
End: 2023-10-30
Payer: COMMERCIAL

## 2023-10-30 DIAGNOSIS — R29.898 DECREASED STRENGTH OF LOWER EXTREMITY: ICD-10-CM

## 2023-10-30 DIAGNOSIS — M25.651 DECREASED RANGE OF MOTION OF BOTH HIPS: Primary | ICD-10-CM

## 2023-10-30 DIAGNOSIS — M25.652 DECREASED RANGE OF MOTION OF BOTH HIPS: Primary | ICD-10-CM

## 2023-10-30 PROCEDURE — 97112 NEUROMUSCULAR REEDUCATION: CPT | Mod: PN

## 2023-10-30 PROCEDURE — 97110 THERAPEUTIC EXERCISES: CPT | Mod: PN

## 2023-10-30 PROCEDURE — 97140 MANUAL THERAPY 1/> REGIONS: CPT | Mod: PN

## 2023-10-30 NOTE — PROGRESS NOTES
[facility]   Physical Therapy Treatment Note     Name: Nils Patino  Clinic Number: [id number]    Therapy Diagnosis:   Encounter Diagnoses   Name Primary?    Decreased range of motion of both hips Yes    Decreased strength of lower extremity      Physician: Estefania Peanloza*    Visit Date: 10/27/2023    Physician Orders: PT Eval and Treat   Medical Diagnosis from Referral:   M25.561 (ICD-10-CM) - Right knee pain   M25.562 (ICD-10-CM) - Left knee pain      Evaluation Date: 10/17/2023  Authorization Period Expiration: 12/31/2023  Plan of Care Expiration: 11/17/2023  Progress Note Due: 11/17/2023  Visit # / Visits authorized: 2/ 20   FOTO: 0/ 3     Precautions: Standard      Time In: 10:01  Time Out: 11:00  Total Billable Time: 59 minutes      SUBJECTIVE     Pt reports: He has not had any pain recently    He was compliant with home exercise program.  Response to previous treatment: Soreness  Functional change: Progressing     Pain: 0/10  Location: bilateral knees     OBJECTIVE     Objective Measures updated at progress report unless specified.     Treatment       Nils received the treatments listed below:        Manual therapy techniques: Joint mobilizations were applied to the: hips for 13 minutes, including:  Posterior glide of femur  Lateral distraction with IR  Long axis distraction Grade V    Therapeutic activities to improve functional performance for 24  minutes, including:    Squat 3x8 155#  SL RDL 3x10 30#  Split squat 3x10 30#    Neuromuscular re-education activities to improve: Coordination, Kinesthetic, Sense, and Proprioception for 22 minutes. The following activities were included:  Bike with emphasis on core control for hip motion 6 min  Hand heel rocks 20x - power band  Seated Hip ER/IR 2x10 each  Stool ER/IR 30x    Not today  Supine SL bridge 2x10  Side lying clams with mod plank 2x10         Patient Education and Home Exercises     Home Exercises Provided and  Patient Education Provided     Education provided:   - HEP    Written Home Exercises Provided: Patient instructed to cont prior HEP. Exercises were reviewed and Nils was able to demonstrate them prior to the end of the session.  Nils demonstrated good  understanding of the education provided. See EMR under Patient Instructions for exercises provided during therapy sessions    ASSESSMENT     Nils was able to squat pain free by the end of the session. He required cues for correct form without compensation of hips. He was challenged with exercises. Plan to progress as tolerated.       Nils Is progressing well towards his goals.     Pt prognosis is Good.     Pt will continue to benefit from skilled outpatient physical therapy to address the deficits listed in the problem list box on initial evaluation, provide pt/family education and to maximize pt's level of independence in the home and community environment.     Pt's spiritual, cultural and educational needs considered and pt agreeable to plan of care and goals.     Anticipated Barriers for therapy: [place]     Goals:  Short Term Goals:  2 weeks (Progressing, not me)  1.Report decreased right knee pain  < / =  1/10  to increase tolerance for squatting, dead lifting, ADL's  2. Increase ROM by 10 degrees where limited in order to perform ADLs without difficulty.  3. Increase strength by 1/3 MMT grade in bilateral lower extremities to increase tolerance for ADL and work activities.  4. Pt to tolerate HEP to improve ROM and independence with ADL's     Long Term Goals: 4 weeks (Progressing, not met)  1.Report decreased right knee pain < / = 0/10  to increase tolerance for squatting, dead lifting, ADL's  2.Patient goal: Pt would like to squat pain free  3.Increase strength to 4+/5 in bilateral lower extremities to increase tolerance for ADL and work activities.  4. Pt will report FOTO at 79 to demonstrate increase in LE function with  every day tasks.       Plan      Plan of care Certification: 10/17/2023 to 11/17/2023.    Continue with PT plan of care with emphasis on hip mobility    Joao Banks, PT, DPT

## 2023-10-30 NOTE — PROGRESS NOTES
[facility]   Physical Therapy Treatment Note     Name: Nils Patino  Clinic Number: [id number]    Therapy Diagnosis:   Encounter Diagnoses   Name Primary?    Decreased range of motion of both hips Yes    Decreased strength of lower extremity        Physician: Estefania Penaloza*    Visit Date: 10/30/2023    Physician Orders: PT Eval and Treat   Medical Diagnosis from Referral:   M25.561 (ICD-10-CM) - Right knee pain   M25.562 (ICD-10-CM) - Left knee pain      Evaluation Date: 10/17/2023  Authorization Period Expiration: 12/31/2023  Plan of Care Expiration: 11/17/2023  Progress Note Due: 11/17/2023  Visit # / Visits authorized: 3/ 20   FOTO: 0/ 3     Precautions: Standard      Time In: 10:01  Time Out: 10:58  Total Billable Time: 57 minutes      SUBJECTIVE     Pt reports: He was sore in his hips after last session, but no pain in knees    He was compliant with home exercise program.  Response to previous treatment: Soreness  Functional change: Progressing     Pain: 0/10  Location: bilateral knees     OBJECTIVE     Objective Measures updated at progress report unless specified.     Treatment       Nils received the treatments listed below:        Manual therapy techniques: Joint mobilizations were applied to the: hips for 9 minutes, including:  Posterior glide of femur  Lateral distraction with IR  Long axis distraction Grade V    Therapeutic activities to improve functional performance for 9 minutes, including:    SL mini squat 2x10  ST hamstring curls 3x8    Not today   Squat 3x8 155#  SL RDL 3x10 30#  Split squat 3x10 30#    Neuromuscular re-education activities to improve: Coordination, Kinesthetic, Sense, and Proprioception for 39 minutes. The following activities were included:    Tibial nerve floss 25x each  Side lying Hip ER 2x10  Wall ball hip circles 2x15 each   Supine SL bridge 2x10  Hand heel rocks 20x - power band  Seated Hip ER/IR 2x10 each  Stool ER/IR 30x    Not  today    Side lying clams with mod plank 2x10   Bike with emphasis on core control for hip motion 6 min      Patient Education and Home Exercises     Home Exercises Provided and Patient Education Provided     Education provided:   - HEP    Written Home Exercises Provided: Patient instructed to cont prior HEP. Exercises were reviewed and Nils was able to demonstrate them prior to the end of the session.  Nils demonstrated good  understanding of the education provided. See EMR under Patient Instructions for exercises provided during therapy sessions    ASSESSMENT     Nils was progressed with hip intrinsic strengthening exercises and tolerated it well. He found to exercises challenging and required cues for hip activation without adductor use. He was told to work in more hamstring strengthening exercises. Plan to progress as tolerated.         Nils Is progressing well towards his goals.     Pt prognosis is Good.     Pt will continue to benefit from skilled outpatient physical therapy to address the deficits listed in the problem list box on initial evaluation, provide pt/family education and to maximize pt's level of independence in the home and community environment.     Pt's spiritual, cultural and educational needs considered and pt agreeable to plan of care and goals.     Anticipated Barriers for therapy: [place]     Goals:  Short Term Goals:  2 weeks (Progressing, not me)  1.Report decreased right knee pain  < / =  1/10  to increase tolerance for squatting, dead lifting, ADL's  2. Increase ROM by 10 degrees where limited in order to perform ADLs without difficulty.  3. Increase strength by 1/3 MMT grade in bilateral lower extremities to increase tolerance for ADL and work activities.  4. Pt to tolerate HEP to improve ROM and independence with ADL's     Long Term Goals: 4 weeks (Progressing, not met)  1.Report decreased right knee pain < / = 0/10  to increase tolerance for  squatting, dead lifting, ADL's  2.Patient goal: Pt would like to squat pain free  3.Increase strength to 4+/5 in bilateral lower extremities to increase tolerance for ADL and work activities.  4. Pt will report FOTO at 79 to demonstrate increase in LE function with every day tasks.       Plan      Plan of care Certification: 10/17/2023 to 11/17/2023.    Continue with PT plan of care with emphasis on hip mobility    Joao Banks, PT, DPT

## 2023-11-01 ENCOUNTER — CLINICAL SUPPORT (OUTPATIENT)
Dept: REHABILITATION | Facility: OTHER | Age: 38
End: 2023-11-01
Payer: COMMERCIAL

## 2023-11-01 DIAGNOSIS — M25.651 DECREASED RANGE OF MOTION OF BOTH HIPS: Primary | ICD-10-CM

## 2023-11-01 DIAGNOSIS — R29.898 DECREASED STRENGTH OF LOWER EXTREMITY: ICD-10-CM

## 2023-11-01 DIAGNOSIS — M25.652 DECREASED RANGE OF MOTION OF BOTH HIPS: Primary | ICD-10-CM

## 2023-11-01 PROCEDURE — 97112 NEUROMUSCULAR REEDUCATION: CPT | Mod: PN

## 2023-11-01 PROCEDURE — 97530 THERAPEUTIC ACTIVITIES: CPT | Mod: PN

## 2023-11-01 NOTE — PROGRESS NOTES
[facility]   Physical Therapy Treatment Note     Name: Nils Patino  Clinic Number: [id number]    Therapy Diagnosis:   Encounter Diagnoses   Name Primary?    Decreased range of motion of both hips Yes    Decreased strength of lower extremity          Physician: Estefania Penaloza*    Visit Date: 11/1/2023    Physician Orders: PT Eval and Treat   Medical Diagnosis from Referral:   M25.561 (ICD-10-CM) - Right knee pain   M25.562 (ICD-10-CM) - Left knee pain      Evaluation Date: 10/17/2023  Authorization Period Expiration: 12/31/2023  Plan of Care Expiration: 11/17/2023  Progress Note Due: 11/17/2023  Visit # / Visits authorized: 3/ 20   FOTO: 0/ 3     Precautions: Standard      Time In: 10:01  Time Out: 11:00  Total Billable Time: 59 minutes      SUBJECTIVE     Pt reports: He has been having no pain     He was compliant with home exercise program.  Response to previous treatment: Soreness  Functional change: Progressing     Pain: 0/10  Location: bilateral knees     OBJECTIVE     Objective Measures updated at progress report unless specified.     HHD: R= 83#, L = 82#    Treatment       Nils received the treatments listed below:      Therapeutic exercises to develop strength, endurance, ROM, and flexibility for 7 minutes including:    Quad strengths assessment     Manual therapy techniques: Joint mobilizations were applied to the: hips for 0 minutes, including:  Posterior glide of femur  Lateral distraction with IR  Long axis distraction Grade V    Therapeutic activities to improve functional performance for 24 minutes, including:    Front Squat 4x8 135#  RDL 4x10 135#  Leg Extension 4x8 85#    Not today   SL mini squat 2x10  ST hamstring curls 3x8  Split squat 3x10 30#    Neuromuscular re-education activities to improve: Coordination, Kinesthetic, Sense, and Proprioception for 27 minutes. The following activities were included:    Bike with emphasis on core control for hip motion 6  min  Side lying Hip ER 2x12  Hand heel rocks 20x - power band  Seated Hip ER/IR 2x10 each  Stool ER/IR 30x    Not today  Wall ball hip circles 2x15 each   Supine SL bridge 2x10  Side lying clams with mod plank 2x10        Patient Education and Home Exercises     Home Exercises Provided and Patient Education Provided     Education provided:   - HEP    Written Home Exercises Provided: Patient instructed to cont prior HEP. Exercises were reviewed and Nils was able to demonstrate them prior to the end of the session.  Nils demonstrated good  understanding of the education provided. See EMR under Patient Instructions for exercises provided during therapy sessions    ASSESSMENT     Nils was progressed with lower extremity strength and hip mobility exercises. He tolerated Front squats well without any increase in pain. He was assessed and found to have symmetrical. He required verbal and tactile cues to perform exercises correctly. Plan to progress as tolerated.       Nils Is progressing well towards his goals.     Pt prognosis is Good.     Pt will continue to benefit from skilled outpatient physical therapy to address the deficits listed in the problem list box on initial evaluation, provide pt/family education and to maximize pt's level of independence in the home and community environment.     Pt's spiritual, cultural and educational needs considered and pt agreeable to plan of care and goals.     Anticipated Barriers for therapy: [place]     Goals:  Short Term Goals:  2 weeks (Progressing, not me)  1.Report decreased right knee pain  < / =  1/10  to increase tolerance for squatting, dead lifting, ADL's  2. Increase ROM by 10 degrees where limited in order to perform ADLs without difficulty.  3. Increase strength by 1/3 MMT grade in bilateral lower extremities to increase tolerance for ADL and work activities.  4. Pt to tolerate HEP to improve ROM and independence with ADL's      Long Term Goals: 4 weeks (Progressing, not met)  1.Report decreased right knee pain < / = 0/10  to increase tolerance for squatting, dead lifting, ADL's  2.Patient goal: Pt would like to squat pain free  3.Increase strength to 4+/5 in bilateral lower extremities to increase tolerance for ADL and work activities.  4. Pt will report FOTO at 79 to demonstrate increase in LE function with every day tasks.       Plan      Plan of care Certification: 10/17/2023 to 11/17/2023.    Continue with PT plan of care with emphasis on hip mobility    Joao Banks, PT, DPT

## 2024-08-13 ENCOUNTER — CLINICAL SUPPORT (OUTPATIENT)
Dept: REHABILITATION | Facility: OTHER | Age: 39
End: 2024-08-13
Payer: COMMERCIAL

## 2024-08-13 DIAGNOSIS — M25.652 DECREASED RANGE OF LEFT HIP MOVEMENT: ICD-10-CM

## 2024-08-13 DIAGNOSIS — M25.552 LEFT HIP PAIN: Primary | ICD-10-CM

## 2024-08-13 PROCEDURE — 97161 PT EVAL LOW COMPLEX 20 MIN: CPT | Mod: PN

## 2024-08-13 PROCEDURE — 97530 THERAPEUTIC ACTIVITIES: CPT | Mod: PN

## 2024-08-13 NOTE — PLAN OF CARE
"[facility]  Physical Therapy Initial Evaluation    Name: Nils Patino  [facility] Number: [id number]    Therapy Diagnosis:   Encounter Diagnoses   Name Primary?    Left hip pain Yes    Decreased range of left hip movement      Physician: Vipul Cristina MD     Physician Orders: Physical Therapy Evaluate and Treat   Medical Diagnosis from Referral: Left hip pain (M25.552)  Evaluation Date: 8/13/2024  Authorization Period Expiration: 8/5/2025  Plan of Care Expiration: 10/11/2024  Visit # / Visits authorized: 1/ 1  Re-assessment: due 9/13/2024  Focus On therapeutic Outcomes (FOTO): 8/13/2024 (1/5; 1)     Time In: 2:00 pm  Time Out: 3:04 pm  Total Billable Time: 10 minutes    Precautions: Standard    Subjective   Date of onset: chronic condition  History of current condition - Nils reports: he tweaked his back recently while cleaning the tub and has been experiencing sciatic pain down the right side. He has previously done exercises for this and feels like he has exercises previously to address this. Endorses hip pain that has been ongoing for the last 8 years and has not been resolved. He worked on his squats the last round and felt pretty good but he hasn't been able to do this. He hasn't been able to run or walk long distances for the last 8 years with increased pain at the end of the activity and the next few days. Pain feels deep in the back of the hip but can move to the front. He does intermittently have buckling in the hip due to pain. He can't reproduce the pain when it does happen. He can have pain that radiates down the side of the leg into the anterio-lateral aspect of his shin/lower leg.  Pain is on both hips but he experiences more pain on the left. [place] dance - usually not affected but he can notice more pain sometimes afterwards.     "My hips feel different - like they are not in the same position in space". He has noticed it more recently over the last few " years but isn't sure if it has been present since initial pain. Also would like to look at his walking.      Burning, tingling, numbness: no  Falls in the last 12 months: no  Popping, clicking, locking, feeling of instability at the hip: intermittent instability due to pain   Denies fine motor skill deficits.     Patient denies dizziness, headaches, blurry vision, nausea, vomiting, impaired sensations in groin and saddle region and changes in bowel/bladder.     Medical History:   Past Medical History:   Diagnosis Date    Bulging lumbar disc     COVID 2021       Surgical History:   Nils Patino  has no past surgical history on file.    Medications:   Nils has a current medication list which includes the following prescription(s): ibuprofen.    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     Prior Therapy: yes multiple rounds for similar complaints  Social History: single story with 5 steps to enter lives alone with 55# dog  Occupation:  - desk work mainly  Prior Level of Function: Independent with Activities of daily living, 8 years ago running and walking for long distances.    Current Level of Function: difficulty running, walking > half a mile, sitting for long periods of time, pain after dancing    Pain:  Current 0/10, worst 5/10, best 0/10   Location: left hip   Description: Dull, achey, Sharp, and inflamed/overused  Aggravating Factors: Walking and running, sitting for long periods of time  Easing Factors: stretching, shifting positions, tightening the muscles, medication     Patients goals: be able to walk and run without pain, improve hip mobility    Objective     Observation:   Left anterior superior iliac spine and lateral malleolus slightly lower  Leg length (belly button to inferior medial malleolus) 100 cm bilateral   After sacroiliac joint  MET: Anterior superior iliac spine equal but left medial malleolus is still lower than right.     Gait: demonstrates slight pelvic elevation on left with decreased hip rotation     Palpation:  Tender to palpation over hip flexors on left     Range of Motion:     Hip Right Active/Passive range of motion  Left Active/Passive range of motion    Abduction 30 20   Extension 12 10   External rotation  Not tested/20 Not tested/43   Internal rotation  Not tested/15; prone 35 Not tested/4; repeated 10; prone 30     Manual Muscle test/Strength:    Hip Right Left   Flexion 5/5 5/5   Extension 4+/5 4+/5   Internal Rotation 5/5 4+/5 pain on release   External Rotation 5/5 pain on right  5/5   Adduction 5/5 5/5   Abduction 4+/5  4/5     Joint Mobility:     Flexibility:   Hamstrings: Moderate tightness left; mild tightness on right     Special Tests:  Bridge Test: (-)  BLANCA: (-)  FADDIR: (+)  Double leg squat: Wide stance with excessive anterior translation of knees       Hip dysfunction and Osteoarthritis Outcome Score (HOOS): 64.7 (lower score = greater disability)     CMS Impairment/Limitation/Restriction for FOTO Hip Survey    Therapist reviewed FOTO scores for Nils Patino on 8/13/2024.   FOTO documents entered into [employer] - see Media section.    Intake Score: 53%       TREATMENT     Total Treatment time separate from Evaluation: 10 minutes    Nils participated in dynamic functional therapeutic activities to improve functional performance for 10 minutes, including:  Home exercise program review, performance and education  [x] Sacroiliac joint MET   [x] Lower trunk rotation   [x] Figure 4 bridge  [x] Seated hip internal rotation Active Assisted Range of Motion/passive range of motion      Home Exercises and Patient Education Provided    Education provided:   Patient was educated on initial evaluation findings and expectations as well as future PT services, procedures, and expectations for optimal compliance with therapy.      Written Home Exercises Provided: Yes, see patient instructions.     Assessment   Nils is a 39 y.o. male referred to outpatient Physical Therapy with a medical diagnosis of Left hip pain. Patient presents with decreased range of motion, strength, flexibility, and increased pain negatively affecting his participation with recreational and houseold duties.       Patient prognosis is Good.   Patient will benefit from skilled outpatient Physical Therapy to address the deficits stated above and in the chart below, provide patient/family education, and to maximize patient's level of independence to return to prior level of function.     Plan of care discussed with patient: Yes  Patient's spiritual, cultural and educational needs considered and patient is agreeable to the plan of care and goals as stated below:     Anticipated Barriers for therapy: chronicity of symptoms    Medical Necessity is demonstrated by the following  History  Co-morbidities and personal factors that may impact the plan of care [x] LOW: no personal factors / co-morbidities  [] MODERATE: 1-2 personal factors / co-morbidities  [] HIGH: 3+ personal factors / co-morbidities    Moderate / High Support Documentation:   Co-morbidities affecting plan of care: bilateral knee pain, bilateral hip pain    Personal Factors:   no deficits     Examination  Body Structures and Functions, activity limitations and participation restrictions that may impact the plan of care [x] LOW: addressing 1-2 elements  [] MODERATE: 3+ elements  [] HIGH: 4+ elements (please support below)    Moderate / High Support Documentation: range of motion, strength, running, walking     Clinical Presentation [x] LOW: stable  [] MODERATE: Evolving  [] HIGH: Unstable     Decision Making/ Complexity Score: low       Goals:    Short term goal: In 4 weeks,  Goal Status   Patient will be independent with home exercise program to promote improved therapy outcomes.     2.  Patient will improve  bilateral hip Manual Muscle test to 5/5 to improve strength for functional tasks.    3. Patient will improve right hip external rotation range of motion by 15 degrees to improve mobility and promote normal movement patterns.       Long term goal: In 8 weeks, Goal Status   4. Patient will be independent with progressed home exercise program to self manage symptoms.     5. Patient will improve Focus On therapeutic Outcomes (FOTO) Score (HOOS) score from 53% to 71% to decrease perceived limitation with mobility    6. Patient will improve left hip internal rotation at 90 degrees range of motion to 15 degrees to improve mobility and promote normal movement patterns.     7. Patient will report walking for 60+ minutes with <2/10 pain 3x/week to promote healthy lifestyle and regular physical exercise.       Plan   Plan of care Certification: 8/13/2024 to 10/11/2024.    Outpatient Physical Therapy 1-2 times weekly for 8 weeks to include the following interventions: Electrical Stimulation , Gait Training, Manual Therapy, Moist Heat/ Ice, Neuromuscular Re-ed, Patient Education, Therapeutic Activities, and Therapeutic Exercise    Patient will be seen by a physical therapist minimally every 6th visit or every 30 days.    Christen Dodd, PT

## 2024-08-13 NOTE — PROGRESS NOTES
"[facility]  Physical Therapy Initial Evaluation    Name: Nils Patino  Clinic Number: [id number]    Therapy Diagnosis: No diagnosis found.  Physician: Vipul Cristina MD    Physician Orders: Physical Therapy Evaluate and Treat   Medical Diagnosis from Referral: Left hip pain (M25.552)  Evaluation Date: 8/13/2024  Authorization Period Expiration: 8/5/2025  Plan of Care Expiration: 10/11/2024  Visit # / Visits authorized: 1/ 1  Re-assessment: due 9/13/2024  Focus On therapeutic Outcomes (FOTO): 8/13/2024 (1/5; 1)     Time In: ***  Time Out: ***  Total Billable Time: *** minutes    Precautions: Standard    Subjective   Date of onset: chronic condition  History of current condition - Nils reports: ***     Burning, tingling, numbness: ***  Falls in the last 6-12 months: ***  Popping, clicking, locking, feeling of instability at the hip: ***    Patient denies dizziness, headaches, blurry vision, nausea, vomiting, impaired sensations in groin and saddle region and changes in bowel/bladder.     Medical History:   Past Medical History:   Diagnosis Date    Bulging lumbar disc     COVID 2021       Surgical History:   Nils Patino  has no past surgical history on file.    Medications:   Nils has a current medication list which includes the following prescription(s): ibuprofen.    Allergies:   Review of patient's allergies indicates:  No Known Allergies     Imaging, none for current complaints    Prior Therapy: yes multiple rounds for similar complaints  Social History: *** {LIVES WITH:[address]}  Occupation: ***  Prior Level of Function: ***   Current Level of Function: ***     Pain:  Current {0-10:20507::"0"}/10, worst {0-10:20507::"0"}/10, best {0-10:20507::"0"}/10   Location: left hip   Description: {Pain Description:05890}  Aggravating Factors: {Causes; Pain:22569}  Easing Factors: {Pain (activities that relieve):96261}    Patients goals: ***    Objective     P! = pain    Observation: "   ***    Palpation:  ***    Range of Motion:     Hip Right Active range of motion  Left Active range of motion    Flexion *** ***   Abduction *** ***   Extension *** ***   External rotation  *** ***   Internal rotation  *** ***   ! = pain    Thoracolumbar Active range of motion Pain/Dysfunction with Movement   Flexion ***    Extension ***    Right side bending ***    Left side bending ***    Right Rotation ***     Left Rotation ***            Manual Muscle test/Strength:    Hip Right Left   Flexion {AMB PT VESTIBULAR STRENGTH:66317} {AMB PT VESTIBULAR STRENGTH:59386}   Extension {AMB PT VESTIBULAR STRENGTH:03005} {AMB PT VESTIBULAR STRENGTH:54298}   Internal Rotation {AMB PT VESTIBULAR STRENGTH:90371} {AMB PT VESTIBULAR STRENGTH:36144}   External Rotation {AMB PT VESTIBULAR STRENGTH:92718} {AMB PT VESTIBULAR STRENGTH:34078}   Adduction {AMB PT VESTIBULAR STRENGTH:00148} {AMB PT VESTIBULAR STRENGTH:18212}   Abduction {AMB PT VESTIBULAR STRENGTH:98467} {AMB PT VESTIBULAR STRENGTH:22472}   Knee     Flexion {AMB PT VESTIBULAR STRENGTH:56764} {AMB PT VESTIBULAR STRENGTH:16612}   Extension {AMB PT VESTIBULAR STRENGTH:97122} {AMB PT VESTIBULAR STRENGTH:88699}   Ankle     Dorsiflexion {AMB PT VESTIBULAR STRENGTH:86607} {AMB PT VESTIBULAR STRENGTH:03173}   Plantarflexion {AMB PT VESTIBULAR STRENGTH:18169} {AMB PT VESTIBULAR STRENGTH:18475}   Inversion {AMB PT VESTIBULAR STRENGTH:15655} {AMB PT VESTIBULAR STRENGTH:84915}   Eversion {AMB PT VESTIBULAR STRENGTH:18020} {AMB PT VESTIBULAR STRENGTH:85759}       Joint Mobility:   ***     Flexibility:   ***  Ely's test: right = *** degrees ; left = *** degrees  Popliteal Angle: right = *** degrees ; left = *** degrees  Nathaniel's test: right = *** ; left = ***  Shravan test: right = *** ; left = ***      Special Tests:  ***  Bridge Test: ***  Step down Test: right: *** ; left ***  BLANCA: ***  FADIR: ***  -Repeated Flexion: ***  -Repeated Ext: ***  -Piriformis Test: ***  -Prone  Instability Test: ***  -overhead Squat: ***    Hip dysfunction and Osteoarthritis Outcome Score (HOOS): *** (lower score = greater disability)     CMS Impairment/Limitation/Restriction for FOTO Hip Survey    Therapist reviewed FOTO scores for Nils Patino on 8/13/2024.   FOTO documents entered into [employer] - see Media section.    Intake Score: ***%               TREATMENT     Total Treatment time separate from Evaluation: *** minutes    Nils participated in dynamic functional therapeutic activities to improve functional performance for ***  minutes, including:  Home exercise program review, performance and education  [] ***       Home Exercises and Patient Education Provided    Education provided:   Patient was educated on initial evaluation findings and expectations as well as future PT services, procedures, and expectations for optimal compliance with therapy.     Written Home Exercises Provided: Yes, see patient instructions.     Assessment   Nils is a 39 y.o. male referred to outpatient Physical Therapy with a medical diagnosis of Left hip pain. Patient presents with decreased range of motion, strength, flexibility, *** special tests, tender to palpation with *** palpation, poor posture and *** causing increased pain and decreased participation with work, recreational and houseold duties.       Patient prognosis is {REHAB PROGNOSIS OHS:47251}.   Patient will benefit from skilled outpatient Physical Therapy to address the deficits stated above and in the chart below, provide patient/family education, and to maximize patient's level of independence to return to prior level of function.     Plan of care discussed with patient: {YES:21378}  Patient's spiritual, cultural and educational needs considered and patient is agreeable to the plan of care and goals as stated below:     Anticipated Barriers for therapy: ***    Medical Necessity is demonstrated by the followingMedical Necessity is demonstrated by the  following  History  Co-morbidities and personal factors that may impact the plan of care [] LOW: no personal factors / co-morbidities  [] MODERATE: 1-2 personal factors / co-morbidities  [] HIGH: 3+ personal factors / co-morbidities    Moderate / High Support Documentation:   Co-morbidities affecting plan of care: ***    Personal Factors:   {Personal Factors:57854}     Examination  Body Structures and Functions, activity limitations and participation restrictions that may impact the plan of care [] LOW: addressing 1-2 elements  [] MODERATE: 3+ elements  [] HIGH: 4+ elements (please support below)    Moderate / High Support Documentation: ***     Clinical Presentation [] LOW: stable  [] MODERATE: Evolving  [] HIGH: Unstable     Decision Making/ Complexity Score: {Desc; low/moderate/high:386779}       Goals:    In *** weeks,   Goal Status   Patient will be independent with home exercise program to promote improved therapy outcomes.  STG    2.  Patient will improve *** Manual Muscle test to ***/5 to improve strength for functional tasks. STG    3. Patient will improve *** range of motion by *** degrees to improve mobility and promote normal movement patterns.  STG    4. Patient will *** STG      Patient will improve *** sit<>stand to *** to improve functional strength and improve mobility.   2. Patient will improve Timed Up and Go (Timed Up and Go) test to *** sec to improve walking speed for functional community ambulation    In *** weeks,  Goal Status   5. Patient will be independent with progressed home exercise program to self manage symptoms.  LTG    6. Patient will improve *** Manual Muscle test to ***/5 to improve strength for functional tasks.  LTG    7. Patient will improve *** single leg stance to 30 seconds to improve balance and decrease fall risk.  LTG    8. Patient will improve *** range of motion by *** degrees to improve mobility and promote normal movement patterns.  LTG    9. Patient will report  walking for 30 minutes with <2/10 pain 5x/week to promote healthy lifestyle and regular physical exercise.  LTG     LTG     LTG        1. Patient will improve Hip dysfunction and Osteoarthritis Outcome Score (HOOS) score from *** to *** to decrease perceived limitation with mobility  2. Patient will improve *** sit<>stand to *** to improve functional strength and improve mobility.   3. Patient will improve Timed Up and Go (Timed Up and Go) test to </= *** sec to improve walking speed and lower extremity strength for functional community ambulation         Plan   Plan of care Certification: 8/13/2024 to 10/11/2024.    Outpatient Physical Therapy 1-2 times weekly for 8 weeks to include the following interventions: Electrical Stimulation  , Gait Training, Manual Therapy, Moist Heat/ Ice, Neuromuscular Re-ed, Patient Education, Therapeutic Activities, and Therapeutic Exercise    Patient will be seen by a physical therapist minimally every 6th visit or every 30 days.    Christen Dodd, PT

## 2024-08-13 NOTE — PATIENT INSTRUCTIONS
Access Code: [email or web address]  URL: [email or web address]  Date: 08/13/2024  Prepared by: Christen Dodd    Exercises  - SIJ MET  - 2 x daily - 1 sets - 5 reps - 5 seconds hold  - Supine Lower Trunk Rotation  - 1 x daily - 1 sets - 20 reps - 3 hold  - Figure 4 Bridge  - 1 x daily - 3-5 x weekly - 2 sets - 10 reps  - Seated Hip Internal Rotation AROM  - 1 x daily - 3-5 x weekly - 2 sets - 10 reps

## 2024-08-14 NOTE — PROGRESS NOTES
[facility]   Physical Therapy Treatment Note     Name: Nils Patino  [facility] Number: [id number]    Therapy Diagnosis:   Encounter Diagnoses   Name Primary?    Chronic left hip pain Yes    Decreased range of left hip movement      Physician: Vipul Cristina MD    Visit Date: 8/15/2024     Physician Orders: Physical Therapy Evaluate and Treat   Medical Diagnosis from Referral: Left hip pain (M25.552)   Evaluation Date: 8/13/2024  Authorization Period Expiration: 8/5/2025   Plan of Care Expiration: 10/11/2024   Progress Note Due: 9/13/2024    Visit # / Visits authorized: 2 (1/ 20)    FOTO: 2/ 5; 1 (Last issued on 8/13/2024)     PTA Visit #: 1/ 5    Precautions: Standard    Time In: 3:03 pm  Time Out: 4:01 pm  Total Billable Time: 54 minutes    SUBJECTIVE   Patient reports: Tightness and intermittent pain in left hip    He was compliant with home exercise program.  Response to previous treatment: No adverse effects  Functional change: None today    Pain: 1/10  Location: Left hip    OBJECTIVE     All Objective info from 8/13/2024:     Observation:    Left anterior superior iliac spine and lateral malleolus slightly lower   Leg length (belly button to inferior medial malleolus) 100 cm bilateral   After sacroiliac joint MET: Anterior superior iliac spine equal but left medial malleolus is still lower than right.      Gait: demonstrates slight pelvic elevation on left with decreased hip rotation      Palpation:  Tender to palpation over hip flexors on left      Range of Motion:      Hip Right Active/Passive range of motion  Left Active/Passive range of motion    Abduction 30 20   Extension 12 10   External rotation  Not tested/20 Not tested/43   Internal rotation  Not tested/15; prone 35 Not tested/4; repeated 10; prone 30      Manual Muscle test/Strength:     Hip Right Left   Flexion 5/5 5/5   Extension 4+/5 4+/5   Internal Rotation 5/5 4+/5 pain on release   External Rotation 5/5 pain on  "right  5/5   Adduction 5/5 5/5   Abduction 4+/5  4/5      Joint Mobility:      Flexibility:   Hamstrings: Moderate tightness left; mild tightness on right      Special Tests:  Bridge Test: (-)  BLANCA: (-)  FADDIR: (+)  Double leg squat: Wide stance with excessive anterior translation of knees         Hip dysfunction and Osteoarthritis Outcome Score (HOOS): 64.7 (lower score = greater disability)      CMS Impairment/Limitation/Restriction for FOTO Hip Survey     Therapist reviewed FOTO scores for Nils Patino on 8/13/2024.   FOTO documents entered into [employer] - see Media section.     Intake Score: 53%     TREATMENT       Patient received therapeutic exercises for 14 minutes for improved strength, endurance, ROM, and flexibility including:  [x] Lower trunk rotation with feet wide x 20  [x] +Figure 4 stretch push/pull 2 x 30" each  [x] +Iliotibial band stretch 2 x 30" each  [x] +Supine hip flexor stretch 2 x 30"      Patient received manual therapeutic technique for 00 minutes for improved soft tissue and joint mobility including:  [] Sacroiliac joint MET       Patient received neuromuscular reeducation for 40 minutes for improved balance, coordination, kinesthetic, sense, proprioception, and posture including:  [x] Figure 4 bridge x 10 each  [x] Seated 3 way hip internal rotation with Yellow Theraband x 10 each  [x] +Bird dog x 10 each  [x] +Fire hydrant x 15 each  [x] +Donkey kicks x 15 each  [x] +Supine 90/90 alternating heel taps x 10 each     Patient received therapeutic activities for 00 minutes for improved tolerance to functional activities including:        PATIENT EDUCATION AND HOME EXERCISES     Home Exercises Provided and Patient Education Provided     Education provided:   - Exercise form and rationale  - Continuance of HEP    Written Home Exercises Provided: Patient instructed to cont prior HEP. Exercises were reviewed and Nils was able to demonstrate them prior to the end of the session.  Nils " demonstrated good  understanding of the education provided. See EMR under Patient Instructions for exercises provided during therapy sessions    ASSESSMENT   Patient tolerated treatment well today, noting instances of discomfort in multiple locations of the hip and thigh, but was unable to recreate pain. Monitor symptoms throughout session, with none lasting for any extended amount of time. Several new stretches and exercises performed today for improved hip and core mobility and strength. Will continue to monitor symptoms and progress as tolerated.     Nils Is progressing well towards his goals.   Pt prognosis is Good.     Pt will continue to benefit from skilled outpatient physical therapy to address the deficits listed in the problem list box on initial evaluation, provide pt/family education and to maximize pt's level of independence in the home and community environment.     Pt's spiritual, cultural and educational needs considered and pt agreeable to plan of care and goals.     Anticipated Barriers for therapy: chronicity of symptoms    Goals:     Short term goal: In 4 weeks,  Goal Status   Patient will be independent with home exercise program to promote improved therapy outcomes.   In Progress   2.  Patient will improve bilateral hip Manual Muscle test to 5/5 to improve strength for functional tasks.  In Progress   3. Patient will improve right hip external rotation range of motion by 15 degrees to improve mobility and promote normal movement patterns.   In Progress      Long term goal: In 8 weeks, Goal Status   4. Patient will be independent with progressed home exercise program to self manage symptoms.   In Progress   5. Patient will improve Focus On therapeutic Outcomes (FOTO) Score (HOOS) score from 53% to 71% to decrease perceived limitation with mobility  In Progress   6. Patient will improve left hip internal rotation at 90 degrees range of motion to 15 degrees to improve mobility and promote normal  movement patterns.   In Progress   7. Patient will report walking for 60+ minutes with <2/10 pain 3x/week to promote healthy lifestyle and regular physical exercise.  In Progress     PLAN   Plan of care Certification: 8/13/2024 to 10/11/2024.     Improve hip mobility and strength    Outpatient Physical Therapy 1-2 times weekly for 8 weeks to include the following interventions: Electrical Stimulation , Gait Training, Manual Therapy, Moist Heat/ Ice, Neuromuscular Re-ed, Patient Education, Therapeutic Activities, and Therapeutic Exercise        Anita Tineo III, PTA

## 2024-08-15 ENCOUNTER — DOCUMENTATION ONLY (OUTPATIENT)
Dept: REHABILITATION | Facility: OTHER | Age: 39
End: 2024-08-15

## 2024-08-15 ENCOUNTER — CLINICAL SUPPORT (OUTPATIENT)
Dept: REHABILITATION | Facility: OTHER | Age: 39
End: 2024-08-15
Payer: COMMERCIAL

## 2024-08-15 DIAGNOSIS — M25.552 CHRONIC LEFT HIP PAIN: Primary | ICD-10-CM

## 2024-08-15 DIAGNOSIS — G89.29 CHRONIC LEFT HIP PAIN: Primary | ICD-10-CM

## 2024-08-15 DIAGNOSIS — M25.652 DECREASED RANGE OF LEFT HIP MOVEMENT: ICD-10-CM

## 2024-08-15 PROCEDURE — 97110 THERAPEUTIC EXERCISES: CPT | Mod: PN,CQ

## 2024-08-15 PROCEDURE — 97112 NEUROMUSCULAR REEDUCATION: CPT | Mod: PN,CQ

## 2024-08-15 NOTE — PROGRESS NOTES
PT/PTA met face to face to discuss pt's treatment plan and progress towards established goals. Pt will be seen by a physical therapist minimally every 6th visit or every 30 days.    Anita Tineo III, PTA

## 2024-08-16 NOTE — PROGRESS NOTES
"[facility]   Physical Therapy Treatment Note     Name: Nils Patino  Clinic Number: [id number]    Therapy Diagnosis:   Encounter Diagnoses   Name Primary?    Chronic left hip pain Yes    Decreased range of left hip movement      Physician: Vipul Cristina MD    Visit Date: 8/19/2024     Physician Orders: Physical Therapy Evaluate and Treat   Medical Diagnosis from Referral: Left hip pain (M25.552)   Evaluation Date: 8/13/2024  Authorization Period Expiration: 8/5/2025   Plan of Care Expiration: 10/11/2024   Progress Note Due: 9/13/2024    Visit # / Visits authorized: 3 (2/ 20)    FOTO: 3/ 5; 1 (Last issued on 8/13/2024)     PTA Visit #: 2/ 5    Precautions: Standard    Time In: 1:01 pm  Time Out: 2:00 pm  Total Billable Time: 55 minutes    SUBJECTIVE   Patient reports: Feels he may have overdone it with his exercises over the weekend    He was compliant with home exercise program.  Response to previous treatment: "Nothing out of the ordinary"  Functional change: None today    Pain: 1/10   Location: Left hip    OBJECTIVE     All Objective info from 8/13/2024:     Observation:    Left anterior superior iliac spine and lateral malleolus slightly lower   Leg length (belly button to inferior medial malleolus) 100 cm bilateral   After sacroiliac joint MET: Anterior superior iliac spine equal but left medial malleolus is still lower than right.      Gait: demonstrates slight pelvic elevation on left with decreased hip rotation      Palpation:  Tender to palpation over hip flexors on left      Range of Motion:      Hip Right Active/Passive range of motion  Left Active/Passive range of motion    Abduction 30 20   Extension 12 10   External rotation  Not tested/20 Not tested/43   Internal rotation  Not tested/15; prone 35 Not tested/4; repeated 10; prone 30      Manual Muscle test/Strength:     Hip Right Left   Flexion 5/5 5/5   Extension 4+/5 4+/5   Internal Rotation 5/5 4+/5 pain on " "release   External Rotation 5/5 pain on right  5/5   Adduction 5/5 5/5   Abduction 4+/5  4/5      Joint Mobility:      Flexibility:   Hamstrings: Moderate tightness left; mild tightness on right      Special Tests:  Bridge Test: (-)  BLANCA: (-)  FADDIR: (+)  Double leg squat: Wide stance with excessive anterior translation of knees         Hip dysfunction and Osteoarthritis Outcome Score (HOOS): 64.7 (lower score = greater disability)      CMS Impairment/Limitation/Restriction for FOTO Hip Survey     Therapist reviewed FOTO scores for Nils Patino on 8/13/2024.   FOTO documents entered into [employer] - see Media section.     Intake Score: 53%     TREATMENT       Patient received therapeutic exercises for 14 minutes for improved strength, endurance, ROM, and flexibility including:  [x] Lower trunk rotation with feet wide x 20   [x] Figure 4 stretch push/pull 2 x 30" each   [x] Iliotibial band stretch 2 x 30" each   [] Supine hip flexor stretch 2 x 30"   [x] +Half kneeling hip flexor stretch 2 x 30"      Patient received manual therapeutic technique for 8 minutes for improved soft tissue and joint mobility including:  [] Sacroiliac joint MET   [x] Long axis distraction on left hip  [x] +90/90 inferior mobilization on left hip     Patient received neuromuscular reeducation for 33 minutes for improved balance, coordination, kinesthetic, sense, proprioception, and posture including:  [x] Figure 4 bridge x 10 each   [x] Seated 3 way hip internal rotation with Yellow Theraband x 10 each   [x] Bird dog x 10 each   [x] Fire hydrant x 15 each   [x] Donkey kicks x 15 each   [x] Supine 90/90 alternating heel taps x 10 each      [x] +Side plank hip dip x 10 each  [x] +Resisted lower trunk rotations with orange cook band x 15 each   [x] +Squats with lateral hip distraction from sport band x 10   [x] +Quadruped posterior mobilization with sport band x 10  [x] +Lateral lunges x 10 each    Patient received therapeutic activities for " 00 minutes for improved tolerance to functional activities including:        PATIENT EDUCATION AND HOME EXERCISES     Home Exercises Provided and Patient Education Provided     Education provided:   - Exercise form and rationale  - Continuance of HEP    Written Home Exercises Provided: Patient instructed to cont prior HEP. Exercises were reviewed and Nils was able to demonstrate them prior to the end of the session.  Nils demonstrated good  understanding of the education provided. See EMR under Patient Instructions for exercises provided during therapy sessions    ASSESSMENT   Added several new exercises today for core and hip, with self mobilizations included. Patient noted pain in hip with hip flexor stretch and trunk rotations. Overall patient tolerated treatment well. Will continue to monitor symptoms and progress as tolerated.    Nils Is progressing well towards his goals.   Pt prognosis is Good.     Pt will continue to benefit from skilled outpatient physical therapy to address the deficits listed in the problem list box on initial evaluation, provide pt/family education and to maximize pt's level of independence in the home and community environment.     Pt's spiritual, cultural and educational needs considered and pt agreeable to plan of care and goals.     Anticipated Barriers for therapy: chronicity of symptoms    Goals:     Short term goal: In 4 weeks,  Goal Status   Patient will be independent with home exercise program to promote improved therapy outcomes.   In Progress   2.  Patient will improve bilateral hip Manual Muscle test to 5/5 to improve strength for functional tasks.  In Progress   3. Patient will improve right hip external rotation range of motion by 15 degrees to improve mobility and promote normal movement patterns.   In Progress      Long term goal: In 8 weeks, Goal Status   4. Patient will be independent with progressed home exercise program to self manage symptoms.   In Progress   5.  Patient will improve Focus On therapeutic Outcomes (FOTO) Score (HOOS) score from 53% to 71% to decrease perceived limitation with mobility  In Progress   6. Patient will improve left hip internal rotation at 90 degrees range of motion to 15 degrees to improve mobility and promote normal movement patterns.   In Progress   7. Patient will report walking for 60+ minutes with <2/10 pain 3x/week to promote healthy lifestyle and regular physical exercise.  In Progress     PLAN   Plan of care Certification: 8/13/2024 to 10/11/2024.     Improve hip mobility and strength    Outpatient Physical Therapy 1-2 times weekly for 8 weeks to include the following interventions: Electrical Stimulation , Gait Training, Manual Therapy, Moist Heat/ Ice, Neuromuscular Re-ed, Patient Education, Therapeutic Activities, and Therapeutic Exercise        Anita Tineo III, PTA

## 2024-08-19 ENCOUNTER — CLINICAL SUPPORT (OUTPATIENT)
Dept: REHABILITATION | Facility: OTHER | Age: 39
End: 2024-08-19
Payer: COMMERCIAL

## 2024-08-19 DIAGNOSIS — G89.29 CHRONIC LEFT HIP PAIN: Primary | ICD-10-CM

## 2024-08-19 DIAGNOSIS — M25.552 CHRONIC LEFT HIP PAIN: Primary | ICD-10-CM

## 2024-08-19 DIAGNOSIS — M25.652 DECREASED RANGE OF LEFT HIP MOVEMENT: ICD-10-CM

## 2024-08-19 PROCEDURE — 97110 THERAPEUTIC EXERCISES: CPT | Mod: PN,CQ

## 2024-08-19 PROCEDURE — 97112 NEUROMUSCULAR REEDUCATION: CPT | Mod: PN,CQ

## 2024-08-19 PROCEDURE — 97140 MANUAL THERAPY 1/> REGIONS: CPT | Mod: PN,CQ

## 2024-08-22 NOTE — PROGRESS NOTES
"[facility]   Physical Therapy Treatment Note     Name: Nils Patino  [facility] Number: [id number]    Therapy Diagnosis:   Encounter Diagnoses   Name Primary?    Chronic left hip pain Yes    Decreased range of left hip movement        Physician: Vipul Cristina MD    Visit Date: 8/23/2024     Physician Orders: Physical Therapy Evaluate and Treat   Medical Diagnosis from Referral: Left hip pain (M25.552)   Evaluation Date: 8/13/2024  Authorization Period Expiration: 8/5/2025   Plan of Care Expiration: 10/11/2024   Progress Note Due: 9/13/2024    Visit # / Visits authorized: 4 (3/ 20)    FOTO: 4/ 5; 1 (Last issued on 8/13/2024)     PTA Visit #: 0/ 5    Precautions: Standard    Time In: 11:14 am  Time Out: 12:18 am  Total Billable Time: 60 minutes    SUBJECTIVE   Patient reports: he is feeling okay, hip feels about the same, nothing different.     He was compliant with home exercise program.  Response to previous treatment: "Nothing out of the ordinary"  Functional change: None today    Pain: 1/10   Location: Left hip    OBJECTIVE     Objective measures updated at progress report unless otherwise noted.      TREATMENT       Patient received therapeutic exercises for 9 minutes for improved strength, endurance, ROM, and flexibility including:  [x] Lower trunk rotation with feet wide x 20   [x] Figure 4 stretch push/pull 2 x 30" each   [x] Iliotibial band stretch 2 x 30" each   [] Supine hip flexor stretch 2 x 30"   [] Half kneeling hip flexor stretch 2 x 30"      Patient received manual therapeutic technique for 8 minutes for improved soft tissue and joint mobility including:  [] Sacroiliac joint MET   [x] Long axis distraction bilateral hips   [x] 90/90 inferior and lateral hip mobilization bilateral     Patient received neuromuscular reeducation for 43 minutes for improved balance, coordination, kinesthetic, sense, proprioception, and posture including:  [x] Figure 4 bridge x 10 each   [] " Seated 3 way hip internal rotation with Yellow Theraband x 10 each   [x] Bird dog x 10 each   [x] Fire hydrant x 15 each   [x] Donkey kicks x 15 each   [x] Supine 90/90 alternating heel taps x 10 each  [x] + Long sitting hip flexion x20 each   [x] + long sitting hip flexion/abduction over sign x20 each       [] Side plank hip dip x 10 each  [] Resisted lower trunk rotations with orange cook band x 15 each   [x] Squats with lateral hip distraction from orange sport band x20 bilateral   [x] + forward lunges with anterior hip stretch from orange sport band x20 bilateral    [x] Quadruped posterior mobilization with orange sport band 10x 10 second hold  [x] + Irish squat with large green super band x10   [x] + single leg dead lift with long foam roll adduction at wall x20 bilateral   [] Lateral lunges x 10 each    Patient received therapeutic activities for 00 minutes for improved tolerance to functional activities including:        PATIENT EDUCATION AND HOME EXERCISES     Home Exercises Provided and Patient Education Provided     Education provided:   - Exercise form and rationale  - Continuance of HEP    Written Home Exercises Provided: Patient instructed to cont prior HEP. Exercises were reviewed and Nils was able to demonstrate them prior to the end of the session.  Nils demonstrated good  understanding of the education provided. See EMR under Patient Instructions for exercises provided during therapy sessions    ASSESSMENT   Progressed with functional activities and hip/core stability with patient reporting appropriate challenge and fatigue. Patient reported that he felt a better stretch with prolonged holds for quadruped hip mobilization. Continue with hip mobilization and core strengthening.     Nils Is progressing well towards his goals.   Patient prognosis is Good.     Patient will continue to benefit from skilled outpatient physical therapy to address the deficits listed in the problem list box on initial  evaluation, provide patient/family education and to maximize patient's level of independence in the home and community environment.     Patient's spiritual, cultural and educational needs considered and patient agreeable to plan of care and goals.     Anticipated Barriers for therapy: chronicity of symptoms    Goals:     Short term goal: In 4 weeks,  Goal Status   Patient will be independent with home exercise program to promote improved therapy outcomes.   In Progress   2.  Patient will improve bilateral hip Manual Muscle test to 5/5 to improve strength for functional tasks.  In Progress   3. Patient will improve right hip external rotation range of motion by 15 degrees to improve mobility and promote normal movement patterns.   In Progress      Long term goal: In 8 weeks, Goal Status   4. Patient will be independent with progressed home exercise program to self manage symptoms.   In Progress   5. Patient will improve Focus On therapeutic Outcomes (FOTO) Score (HOOS) score from 53% to 71% to decrease perceived limitation with mobility  In Progress   6. Patient will improve left hip internal rotation at 90 degrees range of motion to 15 degrees to improve mobility and promote normal movement patterns.   In Progress   7. Patient will report walking for 60+ minutes with <2/10 pain 3x/week to promote healthy lifestyle and regular physical exercise.  In Progress     PLAN   Plan of care Certification: 8/13/2024 to 10/11/2024.     Improve hip mobility and strength    Outpatient Physical Therapy 1-2 times weekly for 8 weeks to include the following interventions: Electrical Stimulation , Gait Training, Manual Therapy, Moist Heat/ Ice, Neuromuscular Re-ed, Patient Education, Therapeutic Activities, and Therapeutic Exercise        Christen Dodd, PT

## 2024-08-23 ENCOUNTER — CLINICAL SUPPORT (OUTPATIENT)
Dept: REHABILITATION | Facility: OTHER | Age: 39
End: 2024-08-23
Payer: COMMERCIAL

## 2024-08-23 DIAGNOSIS — G89.29 CHRONIC LEFT HIP PAIN: Primary | ICD-10-CM

## 2024-08-23 DIAGNOSIS — M25.652 DECREASED RANGE OF LEFT HIP MOVEMENT: ICD-10-CM

## 2024-08-23 DIAGNOSIS — M25.552 CHRONIC LEFT HIP PAIN: Primary | ICD-10-CM

## 2024-08-23 PROCEDURE — 97140 MANUAL THERAPY 1/> REGIONS: CPT | Mod: PN

## 2024-08-23 PROCEDURE — 97110 THERAPEUTIC EXERCISES: CPT | Mod: PN

## 2024-08-23 PROCEDURE — 97112 NEUROMUSCULAR REEDUCATION: CPT | Mod: PN

## 2024-08-25 NOTE — PROGRESS NOTES
"[facility]   Physical Therapy Treatment Note     Name: Nils Patino  [facility] Number: [id number]    Therapy Diagnosis:   Encounter Diagnoses   Name Primary?    Chronic left hip pain Yes    Decreased range of left hip movement      Physician: Vipul Cristina MD    Visit Date: 8/26/2024     Physician Orders: Physical Therapy Evaluate and Treat   Medical Diagnosis from Referral: Left hip pain (M25.552)   Evaluation Date: 8/13/2024  Authorization Period Expiration: 8/5/2025   Plan of Care Expiration: 10/11/2024   Progress Note Due: 9/13/2024    Visit # / Visits authorized: 5 (4/ 20)    FOTO: 5/ 5; 1 (Last issued on 8/13/2024)     PTA Visit #: 0/ 5    Precautions: Standard    Time In: 1:05 pm  Time Out: 2:06 pm  Total Billable Time: 55 minutes    SUBJECTIVE   Patient reports: he feels like he got a good workout in last time which helps his hips feel more stable but the pain is still present.      He was compliant with home exercise program.  Response to previous treatment: "Nothing out of the ordinary"  Functional change: None today    Pain: 1/10   Location: Left hip    OBJECTIVE     Objective measures updated at progress report unless otherwise noted.      TREATMENT       Patient received therapeutic exercises for 6 minutes for improved strength, endurance, ROM, and flexibility including:  [x] Lower trunk rotation with feet wide x 20   [x] Figure 4 stretch push/pull 2 x 30" each   [] Iliotibial band stretch 2 x 30" each   [] Supine hip flexor stretch 2 x 30"   [] Half kneeling hip flexor stretch 2 x 30"      Patient received manual therapeutic technique for 8 minutes for improved soft tissue and joint mobility including:  [] Sacroiliac joint MET   [x] Long axis distraction bilateral hips   [x] 90/90 inferior and lateral hip mobilization bilateral     Patient received neuromuscular reeducation for 43 minutes for improved balance, coordination, kinesthetic, sense, proprioception, and " posture including:  [x] Figure 4 bridge x 10 each   [] Seated 3 way hip internal rotation with Yellow Theraband x 10 each   [x] Bird dog with red CLX band x15 each  [x] + bird dog row with 8# weight x10 bilateral   [x] Fire hydrant with green band x 15 each   [x] Donkey kicks with green band x 15 each   [] Supine 90/90 alternating heel taps x 10 each  [x] Long sitting hip flexion x20 each   [x] long sitting hip flexion/abduction over sign x20 each       [] Side plank hip dip x 10 each  [] Resisted lower trunk rotations with orange cook band x 15 each   [x] Squats with lateral hip distraction from orange sport band x20 bilateral   [x] forward lunges with anterior hip stretch from orange sport band x20 bilateral    [x] Quadruped posterior mobilization with orange sport band 10x 10 second hold  [] Citizen of [place] squat with large green super band x10   [x] single leg dead lift with long foam roll adduction at wall x20 bilateral   [x] + single leg dead lift with foot on wall and red band x10 each   [x] + split squats with 10# medicine ball rotation x10 bilateral   [] + chinese plank with 10# 3x 30 second holds (next visit)  [] + clamshell with 3-way trunk position and blue band x30 each (next visit)  [] + standing clamshell at wall with green band x30 each  (next visit)  [] Lateral lunges x 10 each    Patient received therapeutic activities for 00 minutes for improved tolerance to functional activities including:        PATIENT EDUCATION AND HOME EXERCISES     Home Exercises Provided and Patient Education Provided     Education provided:   - Exercise form and rationale  - Continuance of HEP    Written Home Exercises Provided: Patient instructed to cont prior HEP. Exercises were reviewed and Nils was able to demonstrate them prior to the end of the session.  Nils demonstrated good  understanding of the education provided. See EMR under Patient Instructions for exercises provided during therapy sessions    ASSESSMENT    Continued with functional movements and activities and progressed quadruped exercises with resistance. Pain in the hip remains although he endorses feeling more stable in the hips after exercising.  Continue with core and hip strengthening and mobility. Re-assessment to be performed at next visit.     Nils Is progressing well towards his goals.   Patient prognosis is Good.     Patient will continue to benefit from skilled outpatient physical therapy to address the deficits listed in the problem list box on initial evaluation, provide patient/family education and to maximize patient's level of independence in the home and community environment.     Patient's spiritual, cultural and educational needs considered and patient agreeable to plan of care and goals.     Anticipated Barriers for therapy: chronicity of symptoms    Goals:     Short term goal: In 4 weeks,  Goal Status   Patient will be independent with home exercise program to promote improved therapy outcomes.   In Progress   2.  Patient will improve bilateral hip Manual Muscle test to 5/5 to improve strength for functional tasks.  In Progress   3. Patient will improve right hip external rotation range of motion by 15 degrees to improve mobility and promote normal movement patterns.   In Progress      Long term goal: In 8 weeks, Goal Status   4. Patient will be independent with progressed home exercise program to self manage symptoms.   In Progress   5. Patient will improve Focus On therapeutic Outcomes (FOTO) Score (HOOS) score from 53% to 71% to decrease perceived limitation with mobility  In Progress   6. Patient will improve left hip internal rotation at 90 degrees range of motion to 15 degrees to improve mobility and promote normal movement patterns.   In Progress   7. Patient will report walking for 60+ minutes with <2/10 pain 3x/week to promote healthy lifestyle and regular physical exercise.  In Progress     PLAN   Plan of care Certification:  8/13/2024 to 10/11/2024.     Improve hip mobility and strength    Outpatient Physical Therapy 1-2 times weekly for 8 weeks to include the following interventions: Electrical Stimulation , Gait Training, Manual Therapy, Moist Heat/ Ice, Neuromuscular Re-ed, Patient Education, Therapeutic Activities, and Therapeutic Exercise        Christen Dodd, PT

## 2024-08-26 ENCOUNTER — CLINICAL SUPPORT (OUTPATIENT)
Dept: REHABILITATION | Facility: OTHER | Age: 39
End: 2024-08-26
Payer: COMMERCIAL

## 2024-08-26 DIAGNOSIS — M25.652 DECREASED RANGE OF LEFT HIP MOVEMENT: ICD-10-CM

## 2024-08-26 DIAGNOSIS — M25.552 CHRONIC LEFT HIP PAIN: Primary | ICD-10-CM

## 2024-08-26 DIAGNOSIS — G89.29 CHRONIC LEFT HIP PAIN: Primary | ICD-10-CM

## 2024-08-26 PROCEDURE — 97140 MANUAL THERAPY 1/> REGIONS: CPT | Mod: PN

## 2024-08-26 PROCEDURE — 97112 NEUROMUSCULAR REEDUCATION: CPT | Mod: PN

## 2024-09-09 ENCOUNTER — CLINICAL SUPPORT (OUTPATIENT)
Dept: REHABILITATION | Facility: OTHER | Age: 39
End: 2024-09-09
Payer: COMMERCIAL

## 2024-09-09 DIAGNOSIS — G89.29 CHRONIC LEFT HIP PAIN: Primary | ICD-10-CM

## 2024-09-09 DIAGNOSIS — M25.552 CHRONIC LEFT HIP PAIN: Primary | ICD-10-CM

## 2024-09-09 DIAGNOSIS — M25.652 DECREASED RANGE OF LEFT HIP MOVEMENT: ICD-10-CM

## 2024-09-09 PROCEDURE — 97530 THERAPEUTIC ACTIVITIES: CPT | Mod: PN

## 2024-09-09 PROCEDURE — 97112 NEUROMUSCULAR REEDUCATION: CPT | Mod: PN

## 2024-09-09 NOTE — PROGRESS NOTES
[facility]   Physical Therapy Treatment Note     Name: Nils Patino  [facility] Number: [id number]    Therapy Diagnosis:   Encounter Diagnoses   Name Primary?    Chronic left hip pain Yes    Decreased range of left hip movement        Physician: Vipul Cristina MD    Visit Date: 9/9/2024     Physician Orders: Physical Therapy Evaluate and Treat   Medical Diagnosis from Referral: Left hip pain (M25.552)   Evaluation Date: 8/13/2024  Authorization Period Expiration: 8/5/2025   Plan of Care Expiration: 10/11/2024   Progress Note Due: 10/11/2024   Visit # / Visits authorized: 6 (5/ 20)    FOTO: 1/ 5; 2 (Last issued on 9/9/2024)     PTA Visit #: 0/ 5    Precautions: Standard    Time In: 1302  Time Out: 1400  Total Billable Time: 58 minutes    SUBJECTIVE   Patient reports: he went for a 2 mile walk without increased pain. Has not had difficulty sleeping.  Still can feel tightness in the hip. States he canceled his last 3 appointments due to being sick.    He was compliant with home exercise program.  Response to previous treatment: no adverse effects  Functional change:     Pain: 0/10   Location: Left hip    OBJECTIVE     Observation:   Left anterior superior iliac spine and lateral malleolus slightly lower  Leg length (belly button to inferior medial malleolus) 100 cm bilateral   After sacroiliac joint MET: Anterior superior iliac spine equal but left medial malleolus is still lower than right.      Gait: demonstrates slight pelvic elevation on left with decreased hip rotation      Palpation:  Tender to palpation over hip flexors on left      Range of Motion:      Hip Right Active/Passive range of motion  Left Active/Passive range of motion    Abduction 37 degrees  42 degrees    Extension 22 degrees  20 degrees    External rotation  45 degrees  43 degrees    Internal rotation  35 degrees  27 degrees       Manual Muscle test/Strength:     Hip Right Left   Flexion 5/5 5/5   Extension 5/5 5/5  "  Internal Rotation 5/5 5/5    External Rotation 5/5  5/5   Adduction 5/5 5/5   Abduction 5/5  4/5      Joint Mobility:      Flexibility:   Hamstrings: Moderate tightness left; mild tightness on right   Hip flexors: right within functional limits, left: tight, decreased 25%        Hip dysfunction and Osteoarthritis Outcome Score (HOOS): 64.7-->80.6  (lower score = greater disability)      CMS Impairment/Limitation/Restriction for FOTO Hip Survey     Therapist reviewed FOTO scores for Nils Patino on 8/13/2024.   FOTO documents entered into [email or web address] - see Media section.     Intake Score: 53%           TREATMENT     Below therapeutic interventions were performed under the supervision of therapist and physical therapy technician.      Patient received therapeutic exercises for  minutes for improved strength, endurance, ROM, and flexibility including:  [] Lower trunk rotation with feet wide x 20   [] Figure 4 stretch push/pull 2 x 30" each   [] Iliotibial band stretch 2 x 30" each   [] Supine hip flexor stretch 2 x 30"   [] Half kneeling hip flexor stretch 2 x 30"      Patient received manual therapeutic technique for 00 minutes for improved soft tissue and joint mobility including:  [] Sacroiliac joint MET   [] Long axis distraction bilateral hips   [] 90/90 inferior and lateral hip mobilization bilateral     Patient received neuromuscular reeducation for 38 minutes for improved balance, coordination, kinesthetic, sense, proprioception, and posture including:  [x] Figure 4 bridge x 10 each   [] Seated 3 way hip internal rotation with Yellow Theraband x 10 each   [x] Bird dog with red CLX band x15 each  [x] bird dog row with 8# weight x10 bilateral   [x] Fire hydrant with green band x 15 each   [x] Donkey kicks with green band x 15 each   [] Supine 90/90 alternating heel taps x 10 each    [x] Long sitting hip flexion x 20 each   [x] long sitting hip flexion/abduction over sign x 20 each    [x]+standing hip flexion with " red band around feet x 20 res      [] Side plank hip dip x 10 each  [] Resisted lower trunk rotations with orange cook band x 15 each   [x] Squats with lateral hip distraction from orange sport band x20 bilateral   [x] forward lunges with anterior hip stretch from orange sport band x20 bilateral    [] Quadruped posterior mobilization with orange sport band 10x 10 second hold  [] [place] squat with large green super band x10   [x] single leg dead lift with long foam roll adduction at wall x 20 bilateral   [x] single leg dead lift with foot on wall and red band x 10 each   [x] split squats with 10# medicine ball rotation x10 bilateral   [x] +reverse plank with 10# 3x 30 second holds   [] + clamshell with 3-way trunk position and blue band x30 each (next visit)  [] + standing clamshell at wall with green band x30 each  (next visit)  [] Lateral lunges x 10 each    Patient received therapeutic activities for 20 minutes for improved tolerance to functional activities including:  [x] Reassessment and Focus On Therapeutic Outcomes        PATIENT EDUCATION AND HOME EXERCISES     Home Exercises Provided and Patient Education Provided     Education provided:   - Exercise form and rationale  - Continuance of home exercise program     Written Home Exercises Provided: Patient instructed to cont prior home exercise program. Exercises were reviewed and Nils was able to demonstrate them prior to the end of the session.  Nils demonstrated good  understanding of the education provided. See Electronic Medical Record  under Patient Instructions for exercises provided during therapy sessions    ASSESSMENT   At monthly reassessment, demonstrated increased strength in bilateral lower extremities, but still limited with left hip range of motion and increased pain. Left hip flexor tightness noted today. Will continue with outpatient physical therapy to increase left hip range of motion, increase left hip strength to progress to goals. Was  able to walk 2 miles without significant increase in hip pain.     Nils Is progressing well towards his goals.   Patient prognosis is Good.     Patient will continue to benefit from skilled outpatient physical therapy to address the deficits listed in the problem list box on initial evaluation, provide patient/family education and to maximize patient's level of independence in the home and community environment.     Patient's spiritual, cultural and educational needs considered and patient agreeable to plan of care and goals.     Anticipated Barriers for therapy: chronicity of symptoms    Goals:     Short term goal: In 4 weeks,  Goal Status   Patient will be independent with home exercise program to promote improved therapy outcomes.   In Progress   2.  Patient will improve bilateral hip Manual Muscle test to 5/5 to improve strength for functional tasks.  In Progress   3. Patient will improve right hip external rotation range of motion by 15 degrees to improve mobility and promote normal movement patterns.   In Progress      Long term goal: In 8 weeks, Goal Status   4. Patient will be independent with progressed home exercise program to self manage symptoms.   In Progress   5. Patient will improve Focus On therapeutic Outcomes (FOTO) Score (HOOS) score from 53% to 71% to decrease perceived limitation with mobility  In Progress   6. Patient will improve left hip internal rotation at 90 degrees range of motion to 15 degrees to improve mobility and promote normal movement patterns.   In Progress   7. Patient will report walking for 60+ minutes with <2/10 pain 3x/week to promote healthy lifestyle and regular physical exercise.  In Progress     PLAN   Plan of care Certification: 8/13/2024 to 10/11/2024.     Improve hip mobility and strength    Outpatient Physical Therapy 1-2 times weekly for 8 weeks to include the following interventions: Electrical Stimulation , Gait Training, Manual Therapy, Moist Heat/ Ice,  Neuromuscular Re-ed, Patient Education, Therapeutic Activities, and Therapeutic Exercise        Claudio Aponet, PT

## 2024-09-12 NOTE — PROGRESS NOTES
[facility]   Physical Therapy Treatment Note     Name: Nils Patino  [facility] Number: [id number]    Therapy Diagnosis:   Encounter Diagnoses   Name Primary?    Chronic left hip pain Yes    Decreased range of left hip movement        Physician: Vipul Cristina MD    Visit Date: 9/13/2024     Physician Orders: Physical Therapy Evaluate and Treat   Medical Diagnosis from Referral: Left hip pain (M25.552)   Evaluation Date: 8/13/2024  Authorization Period Expiration: 8/5/2025   Plan of Care Expiration: 10/11/2024   Progress Note Due: 10/11/2024   Visit # / Visits authorized: 6 (5/ 20)    FOTO: 1/ 5; 2 (Last issued on 9/9/2024)     PTA Visit #: 0/ 5    Precautions: Standard    Time In: 1115  Time Out: 1210  Total Billable Time: 55 minutes    SUBJECTIVE   Patient reports: did fine after last session. Woke up a little sore, pointing to his left gluteus chris insertion. When he presses on that spot, he feels pain into the left iliotibial band     He was compliant with home exercise program.  Response to previous treatment: no adverse effects  Functional change:     Pain: 0/10   Location: Left hip    OBJECTIVE     Observation:   Left anterior superior iliac spine and lateral malleolus slightly lower  Leg length (belly button to inferior medial malleolus) 100 cm bilateral   After sacroiliac joint MET: Anterior superior iliac spine equal but left medial malleolus is still lower than right.      Gait: demonstrates slight pelvic elevation on left with decreased hip rotation      Palpation:  Tender to palpation over hip flexors on left      Range of Motion:      Hip Right Active/Passive range of motion  Left Active/Passive range of motion    Abduction 37 degrees  42 degrees    Extension 22 degrees  20 degrees    External rotation  45 degrees  43 degrees    Internal rotation  35 degrees  27 degrees       Manual Muscle test/Strength:     Hip Right Left   Flexion 5/5 5/5   Extension 5/5 5/5  "  Internal Rotation 5/5 5/5    External Rotation 5/5  5/5   Adduction 5/5 5/5   Abduction 5/5  4/5      Joint Mobility:      Flexibility:   Hamstrings: Moderate tightness left; mild tightness on right   Hip flexors: right within functional limits, left: tight, decreased 25%        Hip dysfunction and Osteoarthritis Outcome Score (HOOS): 64.7-->80.6  (lower score = greater disability)      CMS Impairment/Limitation/Restriction for FOTO Hip Survey     Therapist reviewed FOTO scores for Nils Patino on 8/13/2024.   FOTO documents entered into [employer] - see Media section.     Intake Score: 53%           TREATMENT     Below therapeutic interventions were performed under the supervision of therapist and physical therapy technician.      Patient received therapeutic exercises for  minutes for improved strength, endurance, ROM, and flexibility including:  [] Lower trunk rotation with feet wide x 20   [] Figure 4 stretch push/pull 2 x 30" each   [] Iliotibial band stretch 2 x 30" each   [] Supine hip flexor stretch 2 x 30"   [] Half kneeling hip flexor stretch 2 x 30"      Patient received manual therapeutic technique for 5 minutes for improved soft tissue and joint mobility including:  [] Sacroiliac joint MET   [] Long axis distraction bilateral hips   [] 90/90 inferior and lateral hip mobilization bilateral   [x] The stick soft tissue mobilization to left gluteal and iliotibial band     Patient received neuromuscular reeducation for 50 minutes for improved balance, coordination, kinesthetic, sense, proprioception, and posture including:  [x] Figure 4 bridge x 10 each   [x]+side lying 7 way hip 3 sets x 2 repetitions each (abduction/flexion, flexion/extension, clockwise circles, counter clockwise circles, hip thrust    [] Seated 3 way hip internal rotation with Yellow Theraband x 10 each   [x] Bird dog with red CLX band x15 each  [x] bird dog row with 8# weight x10 bilateral   [x] Fire hydrant with green band x 15 each " "  [x] Donkey kicks with green band x 15 each   [] Supine 90/90 alternating heel taps x 10 each    [x] Long sitting hip flexion x 20 each   [x] long sitting hip flexion/abduction over sign x 20 each    [] standing hip flexion with red band around feet x 20 repetitions       [x]+hip thrusts on 12" box 2 x 10 repetitions with 30#    [] Side plank hip dip x 10 each  [] Resisted lower trunk rotations with orange cook band x 15 each   [x] Squats with lateral hip distraction from orange sport band x 20 bilateral   [x] forward lunges with anterior hip stretch from orange sport band x 20 bilateral    [] Quadruped posterior mobilization with orange sport band 10x 10 second hold  [] [place] squat with large green super band x10   [x] single leg dead lift with long foam roll adduction at wall x 20 bilateral   [x] single leg dead lift with foot on wall and red band x 10 each   [] split squats with 10# medicine ball rotation x 10 bilateral   [x] reverse plank with 15# 3 x 30 second holds   [] clamshell with 3-way trunk position and blue band x30 each nv   [x] standing clamshell at wall with green band x30 each    [] Lateral lunges x 10 each    Patient received therapeutic activities for 00 minutes for improved tolerance to functional activities including:  [] Reassessment and Focus On Therapeutic Outcomes        PATIENT EDUCATION AND HOME EXERCISES     Home Exercises Provided and Patient Education Provided     Education provided:   - Exercise form and rationale  - Continuance of home exercise program     Written Home Exercises Provided: Patient instructed to cont prior home exercise program. Exercises were reviewed and Nils was able to demonstrate them prior to the end of the session.  Nils demonstrated good  understanding of the education provided. See Electronic Medical Record  under Patient Instructions for exercises provided during therapy sessions    ASSESSMENT   Arrived with left gluteal max pain and tightness affecting " left iliotibial band. Continued with postural reeducation, core strengthening, and lower extremity strengthening to progress to goals. Increased kettle bell weight today. Good training effect with hip thrusts.    Nils Is progressing well towards his goals.   Patient prognosis is Good.     Patient will continue to benefit from skilled outpatient physical therapy to address the deficits listed in the problem list box on initial evaluation, provide patient/family education and to maximize patient's level of independence in the home and community environment.     Patient's spiritual, cultural and educational needs considered and patient agreeable to plan of care and goals.     Anticipated Barriers for therapy: chronicity of symptoms    Goals:     Short term goal: In 4 weeks,  Goal Status   Patient will be independent with home exercise program to promote improved therapy outcomes.   In Progress   2.  Patient will improve bilateral hip Manual Muscle test to 5/5 to improve strength for functional tasks.  In Progress   3. Patient will improve right hip external rotation range of motion by 15 degrees to improve mobility and promote normal movement patterns.   In Progress      Long term goal: In 8 weeks, Goal Status   4. Patient will be independent with progressed home exercise program to self manage symptoms.   In Progress   5. Patient will improve Focus On therapeutic Outcomes (FOTO) Score (HOOS) score from 53% to 71% to decrease perceived limitation with mobility  In Progress   6. Patient will improve left hip internal rotation at 90 degrees range of motion to 15 degrees to improve mobility and promote normal movement patterns.   In Progress   7. Patient will report walking for 60+ minutes with <2/10 pain 3x/week to promote healthy lifestyle and regular physical exercise.  In Progress     PLAN   Plan of care Certification: 8/13/2024 to 10/11/2024.     Improve hip mobility and strength    Outpatient Physical Therapy 1-2  times weekly for 8 weeks to include the following interventions: Electrical Stimulation , Gait Training, Manual Therapy, Moist Heat/ Ice, Neuromuscular Re-ed, Patient Education, Therapeutic Activities, and Therapeutic Exercise        Claudio Aponte, PT

## 2024-09-13 ENCOUNTER — CLINICAL SUPPORT (OUTPATIENT)
Dept: REHABILITATION | Facility: OTHER | Age: 39
End: 2024-09-13
Payer: COMMERCIAL

## 2024-09-13 DIAGNOSIS — G89.29 CHRONIC LEFT HIP PAIN: Primary | ICD-10-CM

## 2024-09-13 DIAGNOSIS — M25.552 CHRONIC LEFT HIP PAIN: Primary | ICD-10-CM

## 2024-09-13 DIAGNOSIS — M25.652 DECREASED RANGE OF LEFT HIP MOVEMENT: ICD-10-CM

## 2024-09-13 PROCEDURE — 97112 NEUROMUSCULAR REEDUCATION: CPT | Mod: PN

## 2024-09-13 NOTE — PROGRESS NOTES
[facility]   Physical Therapy Treatment Note     Name: Nils Patino  [facility] Number: [id number]    Therapy Diagnosis:   Encounter Diagnoses   Name Primary?    Chronic left hip pain Yes    Decreased range of left hip movement        Physician: Vipul Cristina MD    Visit Date: 9/16/2024     Physician Orders: Physical Therapy Evaluate and Treat   Medical Diagnosis from Referral: Left hip pain (M25.552)   Evaluation Date: 8/13/2024  Authorization Period Expiration: 8/5/2025   Plan of Care Expiration: 10/11/2024   Progress Note Due: 10/11/2024   Visit # / Visits authorized: 8 (7/ 20)    FOTO: 2/ 5; 2 (Last issued on 9/9/2024)     PTA Visit #: 1/ 5    Precautions: Standard    Time In: 1:02 pm  Time Out: 2:00 pm  Total Billable Time: 56 minutes    SUBJECTIVE   Patient reports: In general feels stronger and more stable, but gets a sensation of tightness in hip when walking.    He was compliant with home exercise program.  Response to previous treatment: Sore  Functional change: None today    Pain: 0/10 ; 1/10 walking  Location: Left hip    OBJECTIVE     Updated 9/9/2024:    Observation:   Left anterior superior iliac spine and lateral malleolus slightly lower  Leg length (belly button to inferior medial malleolus) 100 cm bilateral   After sacroiliac joint MET: Anterior superior iliac spine equal but left medial malleolus is still lower than right.      Gait: demonstrates slight pelvic elevation on left with decreased hip rotation      Palpation:  Tender to palpation over hip flexors on left      Range of Motion:      Hip Right Active/Passive range of motion  Left Active/Passive range of motion    Abduction 37 degrees  42 degrees    Extension 22 degrees  20 degrees    External rotation  45 degrees  43 degrees    Internal rotation  35 degrees  27 degrees       Manual Muscle test/Strength:     Hip Right Left   Flexion 5/5 5/5   Extension 5/5 5/5   Internal Rotation 5/5 5/5    External  "Rotation 5/5  5/5   Adduction 5/5 5/5   Abduction 5/5  4/5      Joint Mobility:      Flexibility:   Hamstrings: Moderate tightness left; mild tightness on right   Hip flexors: right within functional limits, left: tight, decreased 25%        Hip dysfunction and Osteoarthritis Outcome Score (HOOS): 64.7-->80.6  (lower score = greater disability)        TREATMENT     Below therapeutic interventions were performed under the supervision of therapist and physical therapy technician.      Patient received therapeutic exercises for 00 minutes for improved strength, endurance, ROM, and flexibility including:  [] Lower trunk rotation with feet wide x 20   [] Figure 4 stretch push/pull 2 x 30" each   [] Iliotibial band stretch 2 x 30" each   [] Supine hip flexor stretch 2 x 30"   [] Half kneeling hip flexor stretch 2 x 30"      Patient received manual therapeutic technique for 00 minutes for improved soft tissue and joint mobility including:  [] Sacroiliac joint MET   [] Long axis distraction bilateral hips   [] 90/90 inferior and lateral hip mobilization bilateral   [] The stick soft tissue mobilization to left gluteal and iliotibial band     Patient received neuromuscular reeducation for 56 minutes for improved balance, coordination, kinesthetic, sense, proprioception, and posture including:  [x] Figure 4 bridge x 10 each   [x] Side lying 7 way hip 3 sets x 2 repetitions each (abduction/flexion, flexion/extension, clockwise circles, counter clockwise circles, hip thrust    [] Seated 3 way hip internal rotation with Yellow Theraband x 10 each   [x] Bird dog with red CLX band x 15 each   [x] Bird dog row with 8# weight x 10 bilateral   [x] Fire hydrant with Green Theraband x 15 each   [x] Donkey kicks with Green Theraband x 15 each    [] Supine 90/90 alternating heel taps x 10 each     [x] Long sitting hip flexion x 20 each   [x] Long sitting hip flexion/abduction over sign x 20 each    [] standing hip flexion with red band " "around feet x 20 repetitions       [x] Hip thrusts on 12" box 2 x 10 repetitions with 30# KB    [] Side plank hip dip x 10 each  [] Resisted lower trunk rotations with orange cook band x 15 each   [] Squats with lateral hip distraction from orange sport band x 20 bilateral   [] Forward lunges with anterior hip stretch from orange sport band x 20 bilateral    [] Quadruped posterior mobilization with orange sport band 10x 10 second hold  [] [place] squat with large green super band x10   [x] Single leg dead lift with long foam roll adduction at wall x 20 bilateral   [x] Single leg dead lift with foot on wall and Red sport band x 10 each   [] Split squats with 10# medicine ball rotation x 10 bilateral   [x] Reverse plank with 15# KB  3 x 45 second holds   [x] +Clamshell with 3-way trunk position and Blue Theraband x 30 each   [x] Standing clamshell at wall with Green Theraband x 30 each    [] Lateral lunges x 10 each    Patient received therapeutic activities for 00 minutes for improved tolerance to functional activities including:  [] Reassessment and Focus On Therapeutic Outcomes        PATIENT EDUCATION AND HOME EXERCISES     Home Exercises Provided and Patient Education Provided     Education provided:   - Exercise form and rationale  - Continuance of home exercise program     Written Home Exercises Provided: Patient instructed to cont prior home exercise program. Exercises were reviewed and Nils was able to demonstrate them prior to the end of the session.  Nils demonstrated good  understanding of the education provided. See Electronic Medical Record  under Patient Instructions for exercises provided during therapy sessions    ASSESSMENT   Patient tolerated treatment well today with no reports of pain. Good training effect noted with today's visit. Will continue to progress as tolerated.    Nils Is progressing well towards his goals.   Patient prognosis is Good.     Patient will continue to benefit from skilled " outpatient physical therapy to address the deficits listed in the problem list box on initial evaluation, provide patient/family education and to maximize patient's level of independence in the home and community environment.     Patient's spiritual, cultural and educational needs considered and patient agreeable to plan of care and goals.     Anticipated Barriers for therapy: chronicity of symptoms    Goals:     Short term goal: In 4 weeks,  Goal Status   Patient will be independent with home exercise program to promote improved therapy outcomes.   In Progress   2.  Patient will improve bilateral hip Manual Muscle test to 5/5 to improve strength for functional tasks.  In Progress   3. Patient will improve right hip external rotation range of motion by 15 degrees to improve mobility and promote normal movement patterns.   In Progress      Long term goal: In 8 weeks, Goal Status   4. Patient will be independent with progressed home exercise program to self manage symptoms.   In Progress   5. Patient will improve Focus On therapeutic Outcomes (FOTO) Score (HOOS) score from 53% to 71% to decrease perceived limitation with mobility  In Progress   6. Patient will improve left hip internal rotation at 90 degrees range of motion to 15 degrees to improve mobility and promote normal movement patterns.   In Progress   7. Patient will report walking for 60+ minutes with <2/10 pain 3x/week to promote healthy lifestyle and regular physical exercise.  In Progress     PLAN   Plan of care Certification: 8/13/2024 to 10/11/2024.     Improve hip mobility and strength    Outpatient Physical Therapy 1-2 times weekly for 8 weeks to include the following interventions: Electrical Stimulation , Gait Training, Manual Therapy, Moist Heat/ Ice, Neuromuscular Re-ed, Patient Education, Therapeutic Activities, and Therapeutic Exercise        Anita Tineo III, PTA

## 2024-09-16 ENCOUNTER — CLINICAL SUPPORT (OUTPATIENT)
Dept: REHABILITATION | Facility: OTHER | Age: 39
End: 2024-09-16
Payer: COMMERCIAL

## 2024-09-16 DIAGNOSIS — G89.29 CHRONIC LEFT HIP PAIN: Primary | ICD-10-CM

## 2024-09-16 DIAGNOSIS — M25.652 DECREASED RANGE OF LEFT HIP MOVEMENT: ICD-10-CM

## 2024-09-16 DIAGNOSIS — M25.552 CHRONIC LEFT HIP PAIN: Primary | ICD-10-CM

## 2024-09-16 PROCEDURE — 97112 NEUROMUSCULAR REEDUCATION: CPT | Mod: PN,CQ

## 2024-09-19 ENCOUNTER — PATIENT MESSAGE (OUTPATIENT)
Dept: REHABILITATION | Facility: OTHER | Age: 39
End: 2024-09-19
Payer: COMMERCIAL

## 2024-09-19 NOTE — PROGRESS NOTES
[facility]   Physical Therapy Treatment Note     Name: Nils Patino  [facility] Number: [id number]    Therapy Diagnosis:   Encounter Diagnoses   Name Primary?    Chronic left hip pain Yes    Decreased range of left hip movement        Physician: Vipul Cristina MD    Visit Date: 9/20/2024     Physician Orders: Physical Therapy Evaluate and Treat   Medical Diagnosis from Referral: Left hip pain (M25.552)   Evaluation Date: 8/13/2024  Authorization Period Expiration: 8/5/2025   Plan of Care Expiration: 10/11/2024   Progress Note Due: 10/11/2024   Visit # / Visits authorized: 9 (8/ 20)    FOTO: 3/ 5; 2 (Last issued on 9/9/2024)     PTA Visit #: 2/ 5    Precautions: Standard    Time In: 11:16 am  Time Out: 12:15 pm  Total Billable Time: 55 minutes    SUBJECTIVE   Patient reports: No changes     He was compliant with home exercise program.  Response to previous treatment: Wasn't that sore  Functional change: None today    Pain: 0/10 ; 1/10 walking  Location: Left hip    OBJECTIVE     Updated 9/9/2024:    Observation:   Left anterior superior iliac spine and lateral malleolus slightly lower  Leg length (belly button to inferior medial malleolus) 100 cm bilateral   After sacroiliac joint MET: Anterior superior iliac spine equal but left medial malleolus is still lower than right.      Gait: demonstrates slight pelvic elevation on left with decreased hip rotation      Palpation:  Tender to palpation over hip flexors on left      Range of Motion:      Hip Right Active/Passive range of motion  Left Active/Passive range of motion    Abduction 37 degrees  42 degrees    Extension 22 degrees  20 degrees    External rotation  45 degrees  43 degrees    Internal rotation  35 degrees  27 degrees       Manual Muscle test/Strength:     Hip Right Left   Flexion 5/5 5/5   Extension 5/5 5/5   Internal Rotation 5/5 5/5    External Rotation 5/5  5/5   Adduction 5/5 5/5   Abduction 5/5  4/5      Joint Mobility:  "     Flexibility:   Hamstrings: Moderate tightness left; mild tightness on right   Hip flexors: right within functional limits, left: tight, decreased 25%        Hip dysfunction and Osteoarthritis Outcome Score (HOOS): 64.7-->80.6  (lower score = greater disability)        TREATMENT         Patient received therapeutic exercises for 00 minutes for improved strength, endurance, ROM, and flexibility including:  [] Lower trunk rotation with feet wide x 20   [] Figure 4 stretch push/pull 2 x 30" each   [] Iliotibial band stretch 2 x 30" each   [] Supine hip flexor stretch 2 x 30"   [] Half kneeling hip flexor stretch 2 x 30"      Patient received manual therapeutic technique for 00 minutes for improved soft tissue and joint mobility including:  [] Sacroiliac joint MET   [] Long axis distraction bilateral hips   [] 90/90 inferior and lateral hip mobilization bilateral   [] The stick soft tissue mobilization to left gluteal and iliotibial band     Patient received neuromuscular reeducation for 41 minutes for improved balance, coordination, kinesthetic, sense, proprioception, and posture including:  [] Figure 4 bridge x 10 each   [] Side lying 7 way hip 3 sets x 2 repetitions each (abduction/flexion, flexion/extension, clockwise circles, counter clockwise circles, hip thrust  [x] +Side lying hip abduction 10 x 10"   [x] +Matrix hip extension with 25# x 15 each    [] Seated 3 way hip internal rotation with Yellow Theraband x 10 each   [] Bird dog with red CLX band x 15 each   [] Bird dog row with 8# weight x 10 bilateral   [] Fire hydrant with Green Theraband x 15 each   [x] Donkey kicks with Green Theraband x 15 each    [] Supine 90/90 alternating heel taps x 10 each     [] Long sitting hip flexion x 20 each   [] Long sitting hip flexion/abduction over sign x 20 each    [x] +Hip flexion in Shravan test position with 2# x 15 each  [] standing hip flexion with red band around feet x 20 repetitions       [x] Hip thrusts on 12" " box 2 x 10 repetitions with 30# KB    [] Side plank hip dip x 10 each  [] Resisted lower trunk rotations with orange cook band x 15 each   [] Squats with lateral hip distraction from orange sport band x 20 bilateral   [] Forward lunges with anterior hip stretch from orange sport band x 20 bilateral    [] Quadruped posterior mobilization with orange sport band 10x 10 second hold  [] [place] squat with large green super band x10   [] Single leg dead lift with long foam roll adduction at wall x 20 bilateral   [] Single leg dead lift with foot on wall and Red sport band x 10 each   [] Split squats with 10# medicine ball rotation x 10 bilateral   [x] +Split squats x 10 each  [] Reverse plank with 15# KB  3 x 45 second holds   [x] Seated 3-way trunk position clams with Blue Theraband x 20 each   [x] Standing clamshell at wall with Green Theraband x 15 each    [] Lateral lunges x 10 each      Patient received therapeutic activities for 14 minutes for improved tolerance to functional activities including:  [] Reassessment and Focus On Therapeutic Outcomes    [x] +Side stepping with Blue Theracuff 30ft x 4  [x] +Hesitation marches with 15# KB  30ft x 2 each  [x] +TRX squats with 10 hip external rotations x 3       PATIENT EDUCATION AND HOME EXERCISES     Home Exercises Provided and Patient Education Provided     Education provided:   - Exercise form and rationale  - Continuance of home exercise program     Written Home Exercises Provided: Patient instructed to cont prior home exercise program. Exercises were reviewed and Nils was able to demonstrate them prior to the end of the session.  Nils demonstrated good  understanding of the education provided. See Electronic Medical Record  under Patient Instructions for exercises provided during therapy sessions    ASSESSMENT   Added several new exercises for hip activation and strengthening. Good training effect with today's treatment. Patient noted feeling restricted in hip with  ambulation. Targeted glutes and hip external rotation. Patient is planning to do a short run over the weekend to see how the hip reacts. Will monitor symptoms and progress as tolerated.    Nils Is progressing well towards his goals.   Patient prognosis is Good.     Patient will continue to benefit from skilled outpatient physical therapy to address the deficits listed in the problem list box on initial evaluation, provide patient/family education and to maximize patient's level of independence in the home and community environment.     Patient's spiritual, cultural and educational needs considered and patient agreeable to plan of care and goals.     Anticipated Barriers for therapy: chronicity of symptoms    Goals:     Short term goal: In 4 weeks,  Goal Status   Patient will be independent with home exercise program to promote improved therapy outcomes.   In Progress   2.  Patient will improve bilateral hip Manual Muscle test to 5/5 to improve strength for functional tasks.  In Progress   3. Patient will improve right hip external rotation range of motion by 15 degrees to improve mobility and promote normal movement patterns.   In Progress      Long term goal: In 8 weeks, Goal Status   4. Patient will be independent with progressed home exercise program to self manage symptoms.   In Progress   5. Patient will improve Focus On therapeutic Outcomes (FOTO) Score (HOOS) score from 53% to 71% to decrease perceived limitation with mobility  In Progress   6. Patient will improve left hip internal rotation at 90 degrees range of motion to 15 degrees to improve mobility and promote normal movement patterns.   In Progress   7. Patient will report walking for 60+ minutes with <2/10 pain 3x/week to promote healthy lifestyle and regular physical exercise.  In Progress     PLAN   Plan of care Certification: 8/13/2024 to 10/11/2024.     Improve hip mobility and strength    Outpatient Physical Therapy 1-2 times weekly for 8 weeks  to include the following interventions: Electrical Stimulation , Gait Training, Manual Therapy, Moist Heat/ Ice, Neuromuscular Re-ed, Patient Education, Therapeutic Activities, and Therapeutic Exercise        Anita Tineo III, PTA

## 2024-09-20 ENCOUNTER — CLINICAL SUPPORT (OUTPATIENT)
Dept: REHABILITATION | Facility: OTHER | Age: 39
End: 2024-09-20
Payer: COMMERCIAL

## 2024-09-20 DIAGNOSIS — M25.652 DECREASED RANGE OF LEFT HIP MOVEMENT: ICD-10-CM

## 2024-09-20 DIAGNOSIS — G89.29 CHRONIC LEFT HIP PAIN: Primary | ICD-10-CM

## 2024-09-20 DIAGNOSIS — M25.552 CHRONIC LEFT HIP PAIN: Primary | ICD-10-CM

## 2024-09-20 PROCEDURE — 97112 NEUROMUSCULAR REEDUCATION: CPT | Mod: PN,CQ

## 2024-09-20 PROCEDURE — 97530 THERAPEUTIC ACTIVITIES: CPT | Mod: PN,CQ

## 2024-09-20 NOTE — PROGRESS NOTES
[facility]   Physical Therapy Treatment Note     Name: Nils Patino  [facility] Number: [id number]    Therapy Diagnosis:   Encounter Diagnoses   Name Primary?    Chronic left hip pain Yes    Decreased range of left hip movement        Physician: Vipul Cristina MD    Visit Date: 9/23/2024     Physician Orders: Physical Therapy Evaluate and Treat   Medical Diagnosis from Referral: Left hip pain (M25.552)   Evaluation Date: 8/13/2024  Authorization Period Expiration: 8/5/2025   Plan of Care Expiration: 10/11/2024   Progress Note Due: 10/11/2024   Visit # / Visits authorized: 10 (9/ 20)    FOTO: 4/ 5; 2 (Last issued on 9/9/2024)     PTA Visit #: 3/ 5    Precautions: Standard    Time In: 1:02 pm  Time Out: 2:00 pm  Total Billable Time: 56 minutes    SUBJECTIVE   Patient reports: Didn't have a chance to go for a run over the weekend. Has a little bit of pain at end range extension in front of  left hip when walking      He was compliant with home exercise program.  Response to previous treatment: Hamstring soreness  Functional change: None today    Pain: 0/10 ; 1/10 walking  Location: Left hip    OBJECTIVE     Updated 9/9/2024:    Observation:   Left anterior superior iliac spine and lateral malleolus slightly lower  Leg length (belly button to inferior medial malleolus) 100 cm bilateral   After sacroiliac joint MET: Anterior superior iliac spine equal but left medial malleolus is still lower than right.      Gait: demonstrates slight pelvic elevation on left with decreased hip rotation      Palpation:  Tender to palpation over hip flexors on left      Range of Motion:      Hip Right Active/Passive range of motion  Left Active/Passive range of motion    Abduction 37 degrees  42 degrees    Extension 22 degrees  20 degrees    External rotation  45 degrees  43 degrees    Internal rotation  35 degrees  27 degrees       Manual Muscle test/Strength:     Hip Right Left   Flexion 5/5 5/5  "  Extension 5/5 5/5   Internal Rotation 5/5 5/5    External Rotation 5/5  5/5   Adduction 5/5 5/5   Abduction 5/5 4/5      Joint Mobility:      Flexibility:   Hamstrings: Moderate tightness left; mild tightness on right   Hip flexors: right within functional limits, left: tight, decreased 25%        Hip dysfunction and Osteoarthritis Outcome Score (HOOS): 64.7-->80.6  (lower score = greater disability)        TREATMENT         Patient received therapeutic exercises for 00 minutes for improved strength, endurance, ROM, and flexibility including:  [] Lower trunk rotation with feet wide x 20   [] Figure 4 stretch push/pull 2 x 30" each   [] Iliotibial band stretch 2 x 30" each   [] Supine hip flexor stretch 2 x 30"   [] Half kneeling hip flexor stretch 2 x 30"      Patient received manual therapeutic technique for 00 minutes for improved soft tissue and joint mobility including:  [] Sacroiliac joint MET   [] Long axis distraction bilateral hips   [] 90/90 inferior and lateral hip mobilization bilateral   [] The stick soft tissue mobilization to left gluteal and iliotibial band     Patient received neuromuscular reeducation for 42 minutes for improved balance, coordination, kinesthetic, sense, proprioception, and posture including:  [] Figure 4 bridge x 10 each   [] Side lying 7 way hip 3 sets x 2 repetitions each (abduction/flexion, flexion/extension, clockwise circles, counter clockwise circles, hip thrust  [x] Side lying hip abduction 10 x 10"   [x] Matrix hip extension with 25# x 15 each    [] Seated 3 way hip internal rotation with Yellow Theraband x 10 each   [] Bird dog with red CLX band x 15 each   [] Bird dog row with 8# weight x 10 bilateral   [] Fire hydrant with Green Theraband x 15 each   [x] Donkey kicks with Green Theraband x 15 each    [] Supine 90/90 alternating heel taps x 10 each     [] Long sitting hip flexion x 20 each   [] Long sitting hip flexion/abduction over sign x 20 each    [x] Hip flexion " "in Shravan test position with 2# x 15 each  [] standing hip flexion with red band around feet x 20 repetitions       [x] Hip thrusts on 12" box 2 x 10 repetitions with 30# KB    [] Side plank hip dip x 10 each  [] Resisted lower trunk rotations with orange cook band x 15 each   [] Squats with lateral hip distraction from orange sport band x 20 bilateral   [] Forward lunges with anterior hip stretch from orange sport band x 20 bilateral    [] Quadruped posterior mobilization with orange sport band 10x 10 second hold  [] [place] squat with large green super band x10   [] Single leg dead lift with long foam roll adduction at wall x 20 bilateral   [] Single leg dead lift with foot on wall and Red sport band x 10 each   [] Split squats with 10# medicine ball rotation x 10 bilateral   [x] Split squats x 10 each   [] Reverse plank with 15# KB  3 x 45 second holds   [x] Seated 3-way trunk position clams with Blue Theraband x 20 each   [x] Standing clamshell at wall with Green Theraband x 15 each    [] Lateral lunges x 10 each      Patient received therapeutic activities for 14 minutes for improved tolerance to functional activities including:  [] Reassessment and Focus On Therapeutic Outcomes    [x] Side stepping with Blue Theracuff 30ft x 4  [x] Hesitation marches with 15# KB  30ft x 2 each  [x] TRX squats with 10 hip external rotations x 3       PATIENT EDUCATION AND HOME EXERCISES     Home Exercises Provided and Patient Education Provided     Education provided:   - Exercise form and rationale  - Continuance of home exercise program     Written Home Exercises Provided: Patient instructed to cont prior home exercise program. Exercises were reviewed and Nils was able to demonstrate them prior to the end of the session.  Nils demonstrated good  understanding of the education provided. See Electronic Medical Record  under Patient Instructions for exercises provided during therapy sessions    ASSESSMENT   Continued with " lower extremity exercises today with no overall adverse effects reported. Consider adding more functional exercises next visit, such as jumping on shuttle and/or lunges. Will continue to progress as tolerated.     Nils Is progressing well towards his goals.   Patient prognosis is Good.     Patient will continue to benefit from skilled outpatient physical therapy to address the deficits listed in the problem list box on initial evaluation, provide patient/family education and to maximize patient's level of independence in the home and community environment.     Patient's spiritual, cultural and educational needs considered and patient agreeable to plan of care and goals.     Anticipated Barriers for therapy: chronicity of symptoms    Goals:     Short term goal: In 4 weeks,  Goal Status   Patient will be independent with home exercise program to promote improved therapy outcomes.   In Progress   2.  Patient will improve bilateral hip Manual Muscle test to 5/5 to improve strength for functional tasks.  In Progress   3. Patient will improve right hip external rotation range of motion by 15 degrees to improve mobility and promote normal movement patterns.   In Progress      Long term goal: In 8 weeks, Goal Status   4. Patient will be independent with progressed home exercise program to self manage symptoms.   In Progress   5. Patient will improve Focus On therapeutic Outcomes (FOTO) Score (HOOS) score from 53% to 71% to decrease perceived limitation with mobility  In Progress   6. Patient will improve left hip internal rotation at 90 degrees range of motion to 15 degrees to improve mobility and promote normal movement patterns.   In Progress   7. Patient will report walking for 60+ minutes with <2/10 pain 3x/week to promote healthy lifestyle and regular physical exercise.  In Progress     PLAN   Plan of care Certification: 8/13/2024 to 10/11/2024.     Improve hip mobility and strength    Outpatient Physical Therapy 1-2  times weekly for 8 weeks to include the following interventions: Electrical Stimulation , Gait Training, Manual Therapy, Moist Heat/ Ice, Neuromuscular Re-ed, Patient Education, Therapeutic Activities, and Therapeutic Exercise        Anita Tineo III, PTA

## 2024-09-23 ENCOUNTER — CLINICAL SUPPORT (OUTPATIENT)
Dept: REHABILITATION | Facility: OTHER | Age: 39
End: 2024-09-23
Payer: COMMERCIAL

## 2024-09-23 DIAGNOSIS — G89.29 CHRONIC LEFT HIP PAIN: Primary | ICD-10-CM

## 2024-09-23 DIAGNOSIS — M25.552 CHRONIC LEFT HIP PAIN: Primary | ICD-10-CM

## 2024-09-23 DIAGNOSIS — M25.652 DECREASED RANGE OF LEFT HIP MOVEMENT: ICD-10-CM

## 2024-09-23 PROCEDURE — 97112 NEUROMUSCULAR REEDUCATION: CPT | Mod: PN,CQ

## 2024-09-23 PROCEDURE — 97530 THERAPEUTIC ACTIVITIES: CPT | Mod: PN,CQ

## 2024-09-26 NOTE — PROGRESS NOTES
[facility]   Physical Therapy Treatment Note     Name: Nils Patnio  [facility] Number: [id number]    Therapy Diagnosis:   Encounter Diagnoses   Name Primary?    Chronic left hip pain Yes    Decreased range of left hip movement        Physician: Vipul Cristina MD    Visit Date: 9/27/2024     Physician Orders: Physical Therapy Evaluate and Treat   Medical Diagnosis from Referral: Left hip pain (M25.552)   Evaluation Date: 8/13/2024   Authorization Period Expiration: 8/5/2025   Plan of Care Expiration: 10/11/2024   Progress Note Due: 10/11/2024   Visit # / Visits authorized: 11 (10/ 20)     FOTO: 6/ 5; 2 (Last issued on 9/9/2024)     PTA Visit #: 4/ 5    Precautions: Standard    Time In: 11:15 pm  Time Out: 12:15 pm  Total Billable Time: 56 minutes    SUBJECTIVE   Patient reports: Noticed that he feels the pain when he lifts the leg without engaging his core    He was compliant with home exercise program.  Response to previous treatment: No issues  Functional change: None today    Pain: 0.5/10 ; 1/10 walking  Location: Left hip    OBJECTIVE     Updated 9/9/2024:    Observation:   Left anterior superior iliac spine and lateral malleolus slightly lower  Leg length (belly button to inferior medial malleolus) 100 cm bilateral   After sacroiliac joint MET: Anterior superior iliac spine equal but left medial malleolus is still lower than right.      Gait: demonstrates slight pelvic elevation on left with decreased hip rotation      Palpation:  Tender to palpation over hip flexors on left      Range of Motion:      Hip Right Active/Passive range of motion  Left Active/Passive range of motion    Abduction 37 degrees  42 degrees    Extension 22 degrees  20 degrees    External rotation  45 degrees  43 degrees    Internal rotation  35 degrees  27 degrees       Manual Muscle test/Strength:     Hip Right Left   Flexion 5/5 5/5   Extension 5/5 5/5   Internal Rotation 5/5 5/5    External Rotation 5/5   "5/5   Adduction 5/5 5/5   Abduction 5/5  4/5      Joint Mobility:      Flexibility:   Hamstrings: Moderate tightness left; mild tightness on right   Hip flexors: right within functional limits, left: tight, decreased 25%        Hip dysfunction and Osteoarthritis Outcome Score (HOOS): 64.7-->80.6  (lower score = greater disability)        TREATMENT         Patient received therapeutic exercises for 00 minutes for improved strength, endurance, ROM, and flexibility including:  [] Lower trunk rotation with feet wide x 20   [] Figure 4 stretch push/pull 2 x 30" each   [] Iliotibial band stretch 2 x 30" each   [] Supine hip flexor stretch 2 x 30"   [] Half kneeling hip flexor stretch 2 x 30"      Patient received manual therapeutic technique for 2 minutes for improved soft tissue and joint mobility including:  [] Sacroiliac joint MET   [] Long axis distraction bilateral hips   [] 90/90 inferior and lateral hip mobilization bilateral   [] The stick soft tissue mobilization to left gluteal and iliotibial band   [x] +Prone internal rotation stretch bilaterally     Patient received neuromuscular reeducation for 38 minutes for improved balance, coordination, kinesthetic, sense, proprioception, and posture including:  [] Figure 4 bridge x 10 each   [] Side lying 7 way hip 3 sets x 2 repetitions each (abduction/flexion, flexion/extension, clockwise circles, counter clockwise circles, hip thrust  [x] Side lying hip abduction 10 x 10"   [x] Matrix hip extension with 25# x 15 each    [] Seated 3 way hip internal rotation with Yellow Theraband x 10 each   [] Bird dog with red CLX band x 15 each   [] Bird dog row with 8# weight x 10 bilateral   [] Fire hydrant with Green Theraband x 15 each   [x] Donkey kicks with Green Theraband x 15 each    [] Supine 90/90 alternating heel taps x 10 each   [x] +Seated 3 way internal rotation x 20 each    [] Long sitting hip flexion x 20 each   [] Long sitting hip flexion/abduction over sign x 20 " "each    [x] Hip flexion in Shravan test position with 3# x 20 each  [] standing hip flexion with red band around feet x 20 repetitions       [x] Hip thrusts on 12" box with 45# bar, 2 x 10     [] Side plank hip dip x 10 each  [] Resisted lower trunk rotations with orange cook band x 15 each   [] Squats with lateral hip distraction from orange sport band x 20 bilateral   [] Forward lunges with anterior hip stretch from orange sport band x 20 bilateral    [] Quadruped posterior mobilization with orange sport band 10x 10 second hold  [] Thai squat with large green super band x10   [] Single leg dead lift with long foam roll adduction at wall x 20 bilateral   [] Single leg dead lift with foot on wall and Red sport band x 10 each   [] Split squats with 10# medicine ball rotation x 10 bilateral   [x] Split squats x 10 each   [] Reverse plank with 15# KB  3 x 45 second holds   [x] Seated 3-way trunk position clams with Blue Theraband x 20 each   [x] Standing clamshell at wall with Blue Theraband x 15 each    [] Lateral lunges x 10 each      Patient received therapeutic activities for 16 minutes for improved tolerance to functional activities including:  [] Reassessment and Focus On Therapeutic Outcomes    [x] Side stepping with Blue Theracuff 30ft x 4  [x] Hesitation marches with 15# KB  30ft x 2 each  [] TRX squats with 10 hip external rotations x 3   [x] +Prancing on Shuttle x 40  [x] +Double leg jumping on Shuttle x 50      PATIENT EDUCATION AND HOME EXERCISES     Home Exercises Provided and Patient Education Provided     Education provided:   - Exercise form and rationale  - Continuance of home exercise program     Written Home Exercises Provided: Patient instructed to cont prior home exercise program. Exercises were reviewed and Nils was able to demonstrate them prior to the end of the session.  Nils demonstrated good  understanding of the education provided. See Electronic Medical Record  under Patient " Instructions for exercises provided during therapy sessions    ASSESSMENT   Added internal rotation and jumping/prancing on shuttle today. Patient noted discomfort in left hip with internal rotation, but it decreased after exercise was completed. Increase weight and resistance with several other activities today. Will monitor symptoms following today's visit and progress as tolerated.    Nils Is progressing well towards his goals.   Patient prognosis is Good.     Patient will continue to benefit from skilled outpatient physical therapy to address the deficits listed in the problem list box on initial evaluation, provide patient/family education and to maximize patient's level of independence in the home and community environment.     Patient's spiritual, cultural and educational needs considered and patient agreeable to plan of care and goals.     Anticipated Barriers for therapy: chronicity of symptoms    Goals:     Short term goal: In 4 weeks,  Goal Status   Patient will be independent with home exercise program to promote improved therapy outcomes.   In Progress   2.  Patient will improve bilateral hip Manual Muscle test to 5/5 to improve strength for functional tasks.  In Progress   3. Patient will improve right hip external rotation range of motion by 15 degrees to improve mobility and promote normal movement patterns.   In Progress      Long term goal: In 8 weeks, Goal Status   4. Patient will be independent with progressed home exercise program to self manage symptoms.   In Progress   5. Patient will improve Focus On therapeutic Outcomes (FOTO) Score (HOOS) score from 53% to 71% to decrease perceived limitation with mobility  In Progress   6. Patient will improve left hip internal rotation at 90 degrees range of motion to 15 degrees to improve mobility and promote normal movement patterns.   In Progress   7. Patient will report walking for 60+ minutes with <2/10 pain 3x/week to promote healthy lifestyle and  regular physical exercise.  In Progress     PLAN   Plan of care Certification: 8/13/2024 to 10/11/2024.     Improve hip mobility and strength     Outpatient Physical Therapy 1-2 times weekly for 8 weeks to include the following interventions: Electrical Stimulation , Gait Training, Manual Therapy, Moist Heat/ Ice, Neuromuscular Re-ed, Patient Education, Therapeutic Activities, and Therapeutic Exercise        Anita Tineo III, PTA

## 2024-09-26 NOTE — PROGRESS NOTES
[facility]   Physical Therapy Treatment Note     Name: Nils Patino  [facility] Number: [id number]    Therapy Diagnosis:   No diagnosis found.      Physician: Vipul Cristina MD    Visit Date: 9/27/2024     Physician Orders: Physical Therapy Evaluate and Treat   Medical Diagnosis from Referral: Left hip pain (M25.552)   Evaluation Date: 8/13/2024  Authorization Period Expiration: 8/5/2025   Plan of Care Expiration: 10/11/2024   Progress Note Due: 10/11/2024   Visit # / Visits authorized: 11 (10/ 20)    FOTO: 5/ 5; 2 (Last issued on 9/9/2024)     PTA Visit #: 0/ 5    Precautions: Standard    Time In: ***  Time Out: ***  Total Billable Time: *** minutes    SUBJECTIVE   Patient reports:***       He was compliant with home exercise program.  Response to previous treatment: Hamstring soreness  Functional change: None today    Pain: 0/10 ; 1/10 walking  Location: Left hip    OBJECTIVE     Updated 9/9/2024:    Observation:   Left anterior superior iliac spine and lateral malleolus slightly lower  Leg length (belly button to inferior medial malleolus) 100 cm bilateral   After sacroiliac joint MET: Anterior superior iliac spine equal but left medial malleolus is still lower than right.      Gait: demonstrates slight pelvic elevation on left with decreased hip rotation      Palpation:  Tender to palpation over hip flexors on left      Range of Motion:      Hip Right Active/Passive range of motion  Left Active/Passive range of motion    Abduction 37 degrees  42 degrees    Extension 22 degrees  20 degrees    External rotation  45 degrees  43 degrees    Internal rotation  35 degrees  27 degrees       Manual Muscle test/Strength:     Hip Right Left   Flexion 5/5 5/5   Extension 5/5 5/5   Internal Rotation 5/5 5/5    External Rotation 5/5  5/5   Adduction 5/5 5/5   Abduction 5/5  4/5      Joint Mobility:      Flexibility:   Hamstrings: Moderate tightness left; mild tightness on right   Hip flexors:  "right within functional limits, left: tight, decreased 25%        Hip dysfunction and Osteoarthritis Outcome Score (HOOS): 64.7-->80.6  (lower score = greater disability)        TREATMENT         Patient received therapeutic exercises for 00 minutes for improved strength, endurance, ROM, and flexibility including:  [] Lower trunk rotation with feet wide x 20   [] Figure 4 stretch push/pull 2 x 30" each   [] Iliotibial band stretch 2 x 30" each   [] Supine hip flexor stretch 2 x 30"   [] Half kneeling hip flexor stretch 2 x 30"      Patient received manual therapeutic technique for 00 minutes for improved soft tissue and joint mobility including:  [] Sacroiliac joint MET   [] Long axis distraction bilateral hips   [] 90/90 inferior and lateral hip mobilization bilateral   [] The stick soft tissue mobilization to left gluteal and iliotibial band     Patient received neuromuscular reeducation for 42 minutes for improved balance, coordination, kinesthetic, sense, proprioception, and posture including:  [] Figure 4 bridge x 10 each   [] Side lying 7 way hip 3 sets x 2 repetitions each (abduction/flexion, flexion/extension, clockwise circles, counter clockwise circles, hip thrust  [x] Side lying hip abduction 10 x 10"   [x] Matrix hip extension with 25# x 15 each    [] Seated 3 way hip internal rotation with Yellow Theraband x 10 each   [] Bird dog with red CLX band x 15 each   [] Bird dog row with 8# weight x 10 bilateral   [] Fire hydrant with Green Theraband x 15 each   [x] Donkey kicks with Green Theraband x 15 each    [] Supine 90/90 alternating heel taps x 10 each     [] Long sitting hip flexion x 20 each   [] Long sitting hip flexion/abduction over sign x 20 each    [x] Hip flexion in Shravan test position with 2# x 15 each  [] standing hip flexion with red band around feet x 20 repetitions       [x] Hip thrusts on 12" box 2 x 10 repetitions with 30# KB    [] Side plank hip dip x 10 each  [] Resisted lower trunk " rotations with orange cook band x 15 each   [] Squats with lateral hip distraction from orange sport band x 20 bilateral   [] Forward lunges with anterior hip stretch from orange sport band x 20 bilateral    [] Quadruped posterior mobilization with orange sport band 10x 10 second hold  [] Ecu[place]orean squat with large green super band x10   [] Single leg dead lift with long foam roll adduction at wall x 20 bilateral   [] Single leg dead lift with foot on wall and Red sport band x 10 each   [] Split squats with 10# medicine ball rotation x 10 bilateral   [x] Split squats x 10 each   [] Reverse plank with 15# KB  3 x 45 second holds   [x] Seated 3-way trunk position clams with Blue Theraband x 20 each   [x] Standing clamshell at wall with Green Theraband x 15 each    [] Lateral lunges x 10 each      Patient received therapeutic activities for 14 minutes for improved tolerance to functional activities including:  [] Reassessment and Focus On Therapeutic Outcomes    [x] Side stepping with Blue Theracuff 30ft x 4  [x] Hesitation marches with 15# KB  30ft x 2 each  [x] TRX squats with 10 hip external rotations x 3       PATIENT EDUCATION AND HOME EXERCISES     Home Exercises Provided and Patient Education Provided     Education provided:   - Exercise form and rationale  - Continuance of home exercise program     Written Home Exercises Provided: Patient instructed to cont prior home exercise program. Exercises were reviewed and Nils was able to demonstrate them prior to the end of the session.  Nils demonstrated good  understanding of the education provided. See Electronic Medical Record  under Patient Instructions for exercises provided during therapy sessions    ASSESSMENT   *** Consider adding more functional exercises next visit, such as jumping on shuttle and/or lunges. Will continue to progress as tolerated.     Nils Is progressing well towards his goals.   Patient prognosis is Good.     Patient will continue to  benefit from skilled outpatient physical therapy to address the deficits listed in the problem list box on initial evaluation, provide patient/family education and to maximize patient's level of independence in the home and community environment.     Patient's spiritual, cultural and educational needs considered and patient agreeable to plan of care and goals.     Anticipated Barriers for therapy: chronicity of symptoms    Goals:     Short term goal: In 4 weeks,  Goal Status   Patient will be independent with home exercise program to promote improved therapy outcomes.   In Progress   2.  Patient will improve bilateral hip Manual Muscle test to 5/5 to improve strength for functional tasks.  In Progress   3. Patient will improve right hip external rotation range of motion by 15 degrees to improve mobility and promote normal movement patterns.   In Progress      Long term goal: In 8 weeks, Goal Status   4. Patient will be independent with progressed home exercise program to self manage symptoms.   In Progress   5. Patient will improve Focus On therapeutic Outcomes (FOTO) Score (HOOS) score from 53% to 71% to decrease perceived limitation with mobility  In Progress   6. Patient will improve left hip internal rotation at 90 degrees range of motion to 15 degrees to improve mobility and promote normal movement patterns.   In Progress   7. Patient will report walking for 60+ minutes with <2/10 pain 3x/week to promote healthy lifestyle and regular physical exercise.  In Progress     PLAN   Plan of care Certification: 8/13/2024 to 10/11/2024.     Improve hip mobility and strength    Outpatient Physical Therapy 1-2 times weekly for 8 weeks to include the following interventions: Electrical Stimulation , Gait Training, Manual Therapy, Moist Heat/ Ice, Neuromuscular Re-ed, Patient Education, Therapeutic Activities, and Therapeutic Exercise        Christen Dodd, PT

## 2024-09-27 ENCOUNTER — CLINICAL SUPPORT (OUTPATIENT)
Dept: REHABILITATION | Facility: OTHER | Age: 39
End: 2024-09-27
Payer: COMMERCIAL

## 2024-09-27 DIAGNOSIS — M25.552 CHRONIC LEFT HIP PAIN: Primary | ICD-10-CM

## 2024-09-27 DIAGNOSIS — M25.652 DECREASED RANGE OF LEFT HIP MOVEMENT: ICD-10-CM

## 2024-09-27 DIAGNOSIS — G89.29 CHRONIC LEFT HIP PAIN: Primary | ICD-10-CM

## 2024-09-27 PROCEDURE — 97530 THERAPEUTIC ACTIVITIES: CPT | Mod: PN,CQ

## 2024-09-27 PROCEDURE — 97112 NEUROMUSCULAR REEDUCATION: CPT | Mod: PN,CQ

## 2024-09-27 NOTE — PROGRESS NOTES
[facility]   Physical Therapy Treatment Note     Name: Nils Patino  [facility] Number: [id number]    Therapy Diagnosis:   Encounter Diagnoses   Name Primary?    Chronic left hip pain Yes    Decreased range of left hip movement      Physician: Vipul Cristina MD    Visit Date: 9/30/2024     Physician Orders: Physical Therapy Evaluate and Treat   Medical Diagnosis from Referral: Left hip pain (M25.552)   Evaluation Date: 8/13/2024   Authorization Period Expiration: 8/5/2025   Plan of Care Expiration: 10/11/2024   Progress Note Due: 10/11/2024   Visit # / Visits authorized: 12 (11/ 20)     FOTO: 1/ 5; 3 (Last issued on 9/30/2024)     PTA Visit #: 0/ 5    Precautions: Standard    Time In: 1:02 pm  Time Out: 2:00 pm  Total Billable Time: 58 minutes    SUBJECTIVE   Patient reports: he tried a run - felt stronger but still feels out of balance right compared to left.      He was compliant with home exercise program.  Response to previous treatment: No issues  Functional change: None today    Pain: 0.5/10 ; 1/10 walking  Location: Left hip    OBJECTIVE      Range of Motion:      Hip Right Active/Passive range of motion  Left Active/Passive range of motion    Abduction 37 degrees  42 degrees    Extension 22 degrees  20 degrees    External rotation  45 degrees  43 degrees    Internal rotation  35 degrees  27 degrees       Manual Muscle test/Strength:     Hip Right Left   Flexion 5/5 5/5   Extension 5/5 5/5   Internal Rotation 5/5 5/5    External Rotation 5/5  5/5   Adduction 5/5 5/5   Abduction 5/5  5/5      Step down test: contralateral hip drop on left step down      Flexibility:   Hamstrings: Moderate tightness left; mild tightness on right   Hip flexors: right within functional limits, left: tight, decreased 25%        Hip dysfunction and Osteoarthritis Outcome Score (HOOS): 64.7--> 80.6 --> 92.3  (lower score = greater disability)        TREATMENT     Patient received therapeutic exercises  "for 1 minutes for improved strength, endurance, ROM, and flexibility including:  [] Lower trunk rotation with feet wide x 20   [] Figure 4 stretch push/pull 2 x 30" each   [] Iliotibial band stretch 2 x 30" each   [] Supine hip flexor stretch 2 x 30"   [] Half kneeling hip flexor stretch 2 x 30"  [x] Hamstring stretch x30 seconds bilateral       Patient received manual therapeutic technique for 00 minutes for improved soft tissue and joint mobility including:  [] Sacroiliac joint MET   [] Long axis distraction bilateral hips   [] 90/90 inferior and lateral hip mobilization bilateral   [] The stick soft tissue mobilization to left gluteal and iliotibial band   [] Prone internal rotation stretch bilaterally     Patient received neuromuscular reeducation for 30 minutes for improved balance, coordination, kinesthetic, sense, proprioception, and posture including:  [] Figure 4 bridge x 10 each   [] Side lying 7 way hip 3 sets x 2 repetitions each (abduction/flexion, flexion/extension, clockwise circles, counter clockwise circles, hip thrust  [] Side lying hip abduction 10 x 10"   [] Matrix hip extension with 25# x 15 each  [x] + Bridge with hamstrings curl 2x 15     [x] Seated 3 way clamshells with purple (x-heavy) loop x30 each    [] Bird dog with red CLX band x 15 each   [] Bird dog row with 8# weight x 10 bilateral   [] Fire hydrant with Green Theraband x 15 each   [] Donkey kicks with Green Theraband x 15 each    [] Supine 90/90 alternating heel taps x 10 each   [] Seated 3 way internal rotation x 20 each    [] Long sitting hip flexion x 20 each   [] Long sitting hip flexion/abduction over sign x 20 each    [] Hip flexion in Shravan test position with 3# x 20 each  [] standing hip flexion with red band around feet x 20 repetitions       [x] Single leg hip thrusts on 12" box with 15#, 2x 15     [] Side plank hip dip x 10 each  [] Resisted lower trunk rotations with orange cook band x 15 each   [] Squats with lateral " hip distraction from orange sport band x 20 bilateral   [] Forward lunges with anterior hip stretch from orange sport band x 20 bilateral    [] Quadruped posterior mobilization with orange sport band 10x 10 second hold  [] Lithuanian squat with large green super band x10   [] Single leg dead lift with long foam roll adduction at wall x 20 bilateral   [] Single leg dead lift with foot on wall and Red sport band x 10 each   [] Split squats with 10# medicine ball rotation x 10 bilateral   [x] Split squats x 15 each   [] Reverse plank with 15# KB  3 x 45 second holds   [] Standing clamshell at wall with Blue Theraband x 15 each    [] Lateral lunges x 10 each    [x] + standing hip hikes x20 bilateral   [x] + forward and lateral heel taps x20 each bilateral   [x] + single leg around the world with 15# kettlebell clockwise/counterclockwise x20 each bilateral     Patient received therapeutic activities for 25 minutes for improved tolerance to functional activities including:  [x] Reassessment and Focus On Therapeutic Outcomes    [x] Side stepping with purple (x-heavy) loop at knees 10 yards x3 laps  [x] Hesitation marches with 15# KB  30ft x 2 each  [] TRX squats with 10 hip external rotations x 3   [] Prancing on Shuttle x 40  [] Double leg jumping on Shuttle x 50      PATIENT EDUCATION AND HOME EXERCISES     Home Exercises Provided and Patient Education Provided     Education provided:   - Exercise form and rationale  - Continuance of home exercise program     Written Home Exercises Provided: Patient instructed to cont prior home exercise program. Exercises were reviewed and Nils was able to demonstrate them prior to the end of the session.  Nils demonstrated good  understanding of the education provided. See Electronic Medical Record  under Patient Instructions for exercises provided during therapy sessions    ASSESSMENT   With re-assessment, noted hip drop with step down likely contributing to patients reports of feeling  off while walking. Emphasis on hip stability and control with functional activities. Reported more fatigue and soreness with minimal hip symptoms. Continue with these progressions as tolerated.     Nils Is progressing well towards his goals.   Patient prognosis is Good.     Patient will continue to benefit from skilled outpatient physical therapy to address the deficits listed in the problem list box on initial evaluation, provide patient/family education and to maximize patient's level of independence in the home and community environment.     Patient's spiritual, cultural and educational needs considered and patient agreeable to plan of care and goals.     Anticipated Barriers for therapy: chronicity of symptoms    Goals:     Short term goal: In 4 weeks,  Goal Status   Patient will be independent with home exercise program to promote improved therapy outcomes.   In Progress   2.  Patient will improve bilateral hip Manual Muscle test to 5/5 to improve strength for functional tasks.  In Progress   3. Patient will improve right hip external rotation range of motion by 15 degrees to improve mobility and promote normal movement patterns.   In Progress      Long term goal: In 8 weeks, Goal Status   4. Patient will be independent with progressed home exercise program to self manage symptoms.   In Progress   5. Patient will improve Focus On therapeutic Outcomes (FOTO) Score (HOOS) score from 53% to 71% to decrease perceived limitation with mobility  In Progress   6. Patient will improve left hip internal rotation at 90 degrees range of motion to 15 degrees to improve mobility and promote normal movement patterns.   In Progress   7. Patient will report walking for 60+ minutes with <2/10 pain 3x/week to promote healthy lifestyle and regular physical exercise.  In Progress     PLAN   Plan of care Certification: 8/13/2024 to 10/11/2024.     Improve hip mobility and strength     Outpatient Physical Therapy 1-2 times weekly  for 8 weeks to include the following interventions: Electrical Stimulation , Gait Training, Manual Therapy, Moist Heat/ Ice, Neuromuscular Re-ed, Patient Education, Therapeutic Activities, and Therapeutic Exercise        Christen Dodd, PT

## 2024-09-30 ENCOUNTER — CLINICAL SUPPORT (OUTPATIENT)
Dept: REHABILITATION | Facility: OTHER | Age: 39
End: 2024-09-30
Payer: COMMERCIAL

## 2024-09-30 DIAGNOSIS — M25.552 CHRONIC LEFT HIP PAIN: Primary | ICD-10-CM

## 2024-09-30 DIAGNOSIS — M25.652 DECREASED RANGE OF LEFT HIP MOVEMENT: ICD-10-CM

## 2024-09-30 DIAGNOSIS — G89.29 CHRONIC LEFT HIP PAIN: Primary | ICD-10-CM

## 2024-09-30 PROCEDURE — 97112 NEUROMUSCULAR REEDUCATION: CPT | Mod: PN

## 2024-09-30 PROCEDURE — 97530 THERAPEUTIC ACTIVITIES: CPT | Mod: PN

## 2024-10-03 NOTE — PROGRESS NOTES
[facility]   Physical Therapy Treatment Note     Name: Nils Patino  [facility] Number: [id number]    Therapy Diagnosis:   Encounter Diagnoses   Name Primary?    Chronic left hip pain Yes    Decreased range of left hip movement      Physician: Vipul Cristina MD    Visit Date: 10/4/2024     Physician Orders: Physical Therapy Evaluate and Treat   Medical Diagnosis from Referral: Left hip pain (M25.552)   Evaluation Date: 8/13/2024   Authorization Period Expiration: 8/5/2025   Plan of Care Expiration: 10/11/2024   Progress Note Due: 10/11/2024   Visit # / Visits authorized: 13 (12/ 20)     FOTO: 2/ 5; 3 (Last issued on 9/30/2024)     PTA Visit #: 1/ 5    Precautions: Standard    Time In: 11:17 am  Time Out: 12:00 am  Total Billable Time: 53 minutes    SUBJECTIVE   Patient reports: Went for a 3 mile run yesterday and had no pain with the run itself, but felt the hip was irritated later that night     He was compliant with home exercise program.  Response to previous treatment: Hamstrings were not as sore as he thought they would be  Functional change: None today    Pain: 1/10 ; 1/10 walking  Location: Left hip    OBJECTIVE     Updated 9/30/2024:     Range of Motion:      Hip Right Active/Passive range of motion  Left Active/Passive range of motion    Abduction 37 degrees  42 degrees    Extension 22 degrees  20 degrees    External rotation  45 degrees  43 degrees    Internal rotation  35 degrees  27 degrees       Manual Muscle test/Strength:     Hip Right Left   Flexion 5/5 5/5   Extension 5/5 5/5   Internal Rotation 5/5 5/5    External Rotation 5/5  5/5   Adduction 5/5 5/5   Abduction 5/5  5/5      Step down test: contralateral hip drop on left step down      Flexibility:   Hamstrings: Moderate tightness left; mild tightness on right   Hip flexors: right within functional limits, left: tight, decreased 25%        Hip dysfunction and Osteoarthritis Outcome Score (HOOS): 64.7--> 80.6 -->  "92.3  (lower score = greater disability)        TREATMENT     Patient received therapeutic exercises for 00 minutes for improved strength, endurance, ROM, and flexibility including:  [] Lower trunk rotation with feet wide x 20   [] Figure 4 stretch push/pull 2 x 30" each   [] Iliotibial band stretch 2 x 30" each   [] Supine hip flexor stretch 2 x 30"   [] Half kneeling hip flexor stretch 2 x 30"  [] Hamstring stretch x30 seconds bilateral       Patient received manual therapeutic technique for 00 minutes for improved soft tissue and joint mobility including:  [] Sacroiliac joint MET   [] Long axis distraction bilateral hips   [] 90/90 inferior and lateral hip mobilization bilateral   [] The stick soft tissue mobilization to left gluteal and iliotibial band   [] Prone internal rotation stretch bilaterally     Patient received neuromuscular reeducation for 43 minutes for improved balance, coordination, kinesthetic, sense, proprioception, and posture including:  [] Figure 4 bridge x 10 each   [] Side lying 7 way hip 3 sets x 2 repetitions each (abduction/flexion, flexion/extension, clockwise circles, counter clockwise circles, hip thrust  [] Side lying hip abduction 10 x 10"   [] Matrix hip extension with 25# x 15 each  [x] Bridge with hamstrings curl 2 x 15   [x] +Single leg bridge with hamstrings curl  x 3 each    [] Seated 3 way clamshells with purple (x-heavy) loop x 30 each    [] Bird dog with red CLX band x 15 each   [] Bird dog row with 8# weight x 10 bilateral   [] Fire hydrant with Green Theraband x 15 each   [] Donkey kicks with Green Theraband x 15 each    [] Supine 90/90 alternating heel taps x 10 each   [] Seated 3 way internal rotation x 20 each    [] Long sitting hip flexion x 20 each   [] Long sitting hip flexion/abduction over sign x 20 each    [] Hip flexion in Shravan test position with 3# x 20 each  [] standing hip flexion with red band around feet x 20 repetitions       [x] Single leg hip thrusts on " "12" box with 30#, 2 x 15 each    [] Side plank hip dip x 10 each  [] Resisted lower trunk rotations with orange cook band x 15 each   [] Squats with lateral hip distraction from orange sport band x 20 bilateral   [] Forward lunges with anterior hip stretch from orange sport band x 20 bilateral    [] Quadruped posterior mobilization with orange sport band 10x 10 second hold  [] Amharic squat with large green super band x10   [] Single leg dead lift with long foam roll adduction at wall x 20 bilateral   [] Single leg dead lift with foot on wall and Red sport band x 10 each   [] Split squats with 10# medicine ball rotation x 10 bilateral   [x] Split squats with 15#KB each hand x 15 each leg  [] Reverse plank with 15# KB  3 x 45 second holds   [] Standing clamshell at wall with Blue Theraband x 15 each    [] Lateral lunges x 10 each    [x] Standing hip hikes x 20 bilateral   [x] Forward step down on stairs x 20 each  [x] Lateral step down on stairs x 20 each   [x] Single leg around the world with 15# kettlebell clockwise/counterclockwise x 20 each bilateral  [x] +Single leg vail squat with Blue sport cord x 15 each    Patient received therapeutic activities for 10 minutes for improved tolerance to functional activities including:  [] Reassessment   [] Focus On Therapeutic Outcomes    [x] Side stepping with purple (x-heavy) loop at knees 10 yards x 3 laps  [x] Hesitation marches with 15# KB  30ft x 2 each  [] TRX squats with 10 hip external rotations x 3   [] Prancing on Shuttle x 40  [] Double leg jumping on Shuttle x 50      PATIENT EDUCATION AND HOME EXERCISES     Home Exercises Provided and Patient Education Provided     Education provided:   - Exercise form and rationale  - Continuance of home exercise program     Written Home Exercises Provided: Patient instructed to cont prior home exercise program. Exercises were reviewed and Nils was able to demonstrate them prior to the end of the session.  Nils demonstrated " good  understanding of the education provided. See Electronic Medical Record  under Patient Instructions for exercises provided during therapy sessions    ASSESSMENT   Increased weight with several exercises and added new exercises today with no reports of pain. Continued to emphasis hip stability with functional activities. Good training effect noted with today's treatment. Will continue to progress as tolerated.      Nils Is progressing well towards his goals.   Patient prognosis is Good.     Patient will continue to benefit from skilled outpatient physical therapy to address the deficits listed in the problem list box on initial evaluation, provide patient/family education and to maximize patient's level of independence in the home and community environment.     Patient's spiritual, cultural and educational needs considered and patient agreeable to plan of care and goals.     Anticipated Barriers for therapy: chronicity of symptoms    Goals:     Short term goal: In 4 weeks,  Goal Status   Patient will be independent with home exercise program to promote improved therapy outcomes.   In Progress   2.  Patient will improve bilateral hip Manual Muscle test to 5/5 to improve strength for functional tasks.  In Progress   3. Patient will improve right hip external rotation range of motion by 15 degrees to improve mobility and promote normal movement patterns.   In Progress      Long term goal: In 8 weeks, Goal Status   4. Patient will be independent with progressed home exercise program to self manage symptoms.   In Progress   5. Patient will improve Focus On therapeutic Outcomes (FOTO) Score (HOOS) score from 53% to 71% to decrease perceived limitation with mobility  In Progress   6. Patient will improve left hip internal rotation at 90 degrees range of motion to 15 degrees to improve mobility and promote normal movement patterns.   In Progress   7. Patient will report walking for 60+ minutes with <2/10 pain 3x/week  to promote healthy lifestyle and regular physical exercise.  In Progress     PLAN   Plan of care Certification: 8/13/2024 to 10/11/2024.     Improve hip mobility and strength     Outpatient Physical Therapy 1-2 times weekly for 8 weeks to include the following interventions: Electrical Stimulation , Gait Training, Manual Therapy, Moist Heat/ Ice, Neuromuscular Re-ed, Patient Education, Therapeutic Activities, and Therapeutic Exercise        Anita Tineo III, PTA

## 2024-10-04 ENCOUNTER — CLINICAL SUPPORT (OUTPATIENT)
Dept: REHABILITATION | Facility: OTHER | Age: 39
End: 2024-10-04
Payer: COMMERCIAL

## 2024-10-04 DIAGNOSIS — G89.29 CHRONIC LEFT HIP PAIN: Primary | ICD-10-CM

## 2024-10-04 DIAGNOSIS — M25.652 DECREASED RANGE OF LEFT HIP MOVEMENT: ICD-10-CM

## 2024-10-04 DIAGNOSIS — M25.552 CHRONIC LEFT HIP PAIN: Primary | ICD-10-CM

## 2024-10-04 PROCEDURE — 97112 NEUROMUSCULAR REEDUCATION: CPT | Mod: PN,CQ

## 2024-10-04 PROCEDURE — 97530 THERAPEUTIC ACTIVITIES: CPT | Mod: PN,CQ

## 2024-10-07 ENCOUNTER — CLINICAL SUPPORT (OUTPATIENT)
Dept: REHABILITATION | Facility: OTHER | Age: 39
End: 2024-10-07
Payer: COMMERCIAL

## 2024-10-07 DIAGNOSIS — G89.29 CHRONIC LEFT HIP PAIN: Primary | ICD-10-CM

## 2024-10-07 DIAGNOSIS — M25.652 DECREASED RANGE OF LEFT HIP MOVEMENT: ICD-10-CM

## 2024-10-07 DIAGNOSIS — M25.552 CHRONIC LEFT HIP PAIN: Primary | ICD-10-CM

## 2024-10-07 PROCEDURE — 97112 NEUROMUSCULAR REEDUCATION: CPT | Mod: PN

## 2024-10-07 PROCEDURE — 97530 THERAPEUTIC ACTIVITIES: CPT | Mod: PN

## 2024-10-07 NOTE — PROGRESS NOTES
"[facility]   Physical Therapy Treatment Note     Name: Nils Patino  [facility] Number: [id number]    Therapy Diagnosis:   No diagnosis found.    Physician: Vipul Cristina MD    Visit Date: 10/7/2024     Physician Orders: Physical Therapy Evaluate and Treat   Medical Diagnosis from Referral: Left hip pain (M25.552)   Evaluation Date: 8/13/2024   Authorization Period Expiration: 8/5/2025   Plan of Care Expiration: 10/11/2024   Progress Note Due: 10/11/2024   Visit # / Visits authorized: 14 (13/ 20)     FOTO: 3/ 5; 3 (Last issued on 9/30/2024)     PTA Visit #: 0/ 5    Precautions: Standard    Time In: 1:05 pm   Time Out: 2:00 pm  Total Billable Time: 55 minutes (1:1 treatment with therapist/technician supervision)    SUBJECTIVE   Patient reports: he went for a 2 mile run and felt okay, mild hip discomfort afterwards but overall better from before he started therapy.      He was compliant with home exercise program.  Response to previous treatment: Hamstrings were not as sore as he thought they would be  Functional change: None today    Pain: 1/10 ; 1/10 walking  Location: Left hip    OBJECTIVE     Objective measures updated at progress report unless otherwise noted.      TREATMENT     Patient received therapeutic exercises for 00 minutes for improved strength, endurance, ROM, and flexibility including:  [] Lower trunk rotation with feet wide x 20   [] Figure 4 stretch push/pull 2 x 30" each   [] Iliotibial band stretch 2 x 30" each   [] Supine hip flexor stretch 2 x 30"   [] Half kneeling hip flexor stretch 2 x 30"  [] Hamstring stretch x30 seconds bilateral       Patient received manual therapeutic technique for 00 minutes for improved soft tissue and joint mobility including:  [] Sacroiliac joint MET   [] Long axis distraction bilateral hips   [] 90/90 inferior and lateral hip mobilization bilateral   [] The stick soft tissue mobilization to left gluteal and iliotibial band   [] Prone " "internal rotation stretch bilaterally     Patient received neuromuscular reeducation for 40 minutes for improved balance, coordination, kinesthetic, sense, proprioception, and posture including:  [] Figure 4 bridge x 10 each   [] Side lying 7 way hip 3 sets x 2 repetitions each (abduction/flexion, flexion/extension, clockwise circles, counter clockwise circles, hip thrust  [] Side lying hip abduction 10 x 10"   [] Matrix hip extension with 25# x 15 each  [x] Bridge with hamstrings curl 2 x 15   [x] Single leg bridge with hamstrings curl  x 8 each    [] Seated 3 way clamshells with purple (x-heavy) loop x 30 each    [] Bird dog with red CLX band x 15 each   [] Bird dog row with 8# weight x 10 bilateral   [] Fire hydrant with Green Theraband x 15 each   [] Donkey kicks with Green Theraband x 15 each    [] Supine 90/90 alternating heel taps x 10 each   [] Seated 3 way internal rotation x 20 each    [] Long sitting hip flexion x 20 each   [] Long sitting hip flexion/abduction over sign x 20 each    [] Hip flexion in Shravan test position with 3# x 20 each  [] standing hip flexion with red band around feet x 20 repetitions       [x] Single leg hip thrusts on 12" box with 20#, 2 x 15 each    [] Side plank hip dip x 10 each  [] Resisted lower trunk rotations with orange cook band x 15 each   [] Squats with lateral hip distraction from orange sport band x 20 bilateral   [] Forward lunges with anterior hip stretch from orange sport band x 20 bilateral    [] Quadruped posterior mobilization with orange sport band 10x 10 second hold  [] Italian squat with large green super band x10   [] Single leg dead lift with long foam roll adduction at wall x 20 bilateral   [] Single leg dead lift with foot on wall and Red sport band x 10 each   [] Split squats with 10# medicine ball rotation x 10 bilateral   [x] Split squats with 15#KB each hand x 15 each leg  [] Reverse plank with 15# KB  3 x 45 second holds   [] Standing clamshell at " wall with Blue Theraband x 15 each    [x] Lateral lunges 3x 10 each    [x] Standing hip hikes x 20 bilateral   [x] Forward step down on stairs x 20 each  [x] Lateral step down on stairs x 20 each   [x] Single leg around the world with 15# kettlebell clockwise/counterclockwise x 20 each bilateral  [x] Single leg vail squat with Blue sport cord x 15 each    Patient received therapeutic activities for 15 minutes for improved tolerance to functional activities including:  [] Reassessment   [] Focus On Therapeutic Outcomes    [x] Side stepping with purple (x-heavy) loop at knees 10 yards x 3 laps (progress resistance at next visit)  [x] Hesitation marches with 15# KB  30ft x 2 each  [] TRX squats with 10 hip external rotations x 3   [] Prancing on Shuttle x 40  [] Double leg jumping on Shuttle x 50  [x] + Single leg star on glider x10 bilateral       PATIENT EDUCATION AND HOME EXERCISES     Home Exercises Provided and Patient Education Provided     Education provided:   - Exercise form and rationale  - Continuance of home exercise program     Written Home Exercises Provided: Patient instructed to cont prior home exercise program. Exercises were reviewed and Nils was able to demonstrate them prior to the end of the session.  Nils demonstrated good  understanding of the education provided. See Electronic Medical Record  under Patient Instructions for exercises provided during therapy sessions    ASSESSMENT   Patient tolerated treatment well today and was able to perform more single leg hamstring curl bridges. Decreased single leg hip thrust weight today per request as he overestimated his weight tolerance last visit. Improved performance and form with reduced weight. Continue with global hip mobility and strengthening/stability as tolerated.        Nils Is progressing well towards his goals.   Patient prognosis is Good.     Patient will continue to benefit from skilled outpatient physical therapy to address the deficits  listed in the problem list box on initial evaluation, provide patient/family education and to maximize patient's level of independence in the home and community environment.     Patient's spiritual, cultural and educational needs considered and patient agreeable to plan of care and goals.     Anticipated Barriers for therapy: chronicity of symptoms    Goals:     Short term goal: In 4 weeks,  Goal Status   Patient will be independent with home exercise program to promote improved therapy outcomes.   In Progress   2.  Patient will improve bilateral hip Manual Muscle test to 5/5 to improve strength for functional tasks.  In Progress   3. Patient will improve right hip external rotation range of motion by 15 degrees to improve mobility and promote normal movement patterns.   In Progress      Long term goal: In 8 weeks, Goal Status   4. Patient will be independent with progressed home exercise program to self manage symptoms.   In Progress   5. Patient will improve Focus On therapeutic Outcomes (FOTO) Score (HOOS) score from 53% to 71% to decrease perceived limitation with mobility  In Progress   6. Patient will improve left hip internal rotation at 90 degrees range of motion to 15 degrees to improve mobility and promote normal movement patterns.   In Progress   7. Patient will report walking for 60+ minutes with <2/10 pain 3x/week to promote healthy lifestyle and regular physical exercise.  In Progress     PLAN   Plan of care Certification: 8/13/2024 to 10/11/2024.     Improve hip mobility and strength     Outpatient Physical Therapy 1-2 times weekly for 8 weeks to include the following interventions: Electrical Stimulation , Gait Training, Manual Therapy, Moist Heat/ Ice, Neuromuscular Re-ed, Patient Education, Therapeutic Activities, and Therapeutic Exercise        Christen Dodd, PT

## 2025-03-19 NOTE — PROGRESS NOTES
HISTORY OF PRESENT ILLNESS   Nils Patino, a 40 y.o. male, presents today for evaluation of his left HIP.    Patient reports onset of chronic pain beginning 9 years ago, but anterior hip pain has been present for 2 years. Patient reports  working out and feeling pain the following day . Pain is located along anterior aspect of HIP. Pain is 1/10 at present & up to 4/10 with provacative activity including walking, running, and dance . Pain is described as sharp and dull. Patient states pain does not radiate.     Associated symptoms include decreased ROM. Pain is aggravated by activities above & occur daily . Symptoms do interfere with sleep. Patient reports pain & symptoms are staying the same . Patient reports no prior surgery to HIP.     Prior treatment Nils Patino has tried   OTC Acetaminophen - No  OTC NSAID - Yes - ibuprofen PRN   Rx NSAID - No   Rx Narcotic/Other - No   Brace - No   Injection - Cortisone - No   Injection - Biologics - No   Activity Modification - No  Physical Therapy - Yes - fall/winter 2024   Home Exercise Program - Yes  Assistive Device - No  Other -  none      Review of systems (ROS):  A 10+ review of systems was performed with pertinent positives and negatives noted above in the history of present illness. Other systems were negative unless otherwise specified.    PHYSICAL EXAMINATION  General:  The patient is alert and oriented x 3.  Mood is pleasant.  Observation of ears, eyes and nose reveal no gross abnormalities.  HEENT: NCAT, sclera nonicteric  Lungs: Respirations are equal and unlabored.   Gait is coordinated. Patient can toe walk and heel walk without difficulty.    HIP/PELVIS EXAMINATION    Observation/Inspection  Gait:   Antalgic   Alignment:  Neutral   Scars:   None   Muscle atrophy: None   Effusion:  None   Warmth:  None   Discoloration:   None   Leg lengths:   Equal   Pelvis:    Level     Tenderness/Crepitus (T/C):      T / C  Lateral Gluteal region  + / -  Trochanteric  bursa   + / -  Piriformis    - / -  SI joint    - / -  Psoas tendon   - / -  Rectus insertion  - / -  Adductor insertion  - / -  Pubic symphysis  - / -    ROM: (* = pain)    Flexion:      120 degrees  External rotation:   40 degrees  Internal rotation with axial load:  30 degrees  Internal rotation without axial load:  40 degrees  Abduction:    45 degrees  Adduction:     20 degrees    Special Tests:  Pain w/ forced internal rotation (FADIR):  +  Pain w/ forced external rotation (BLANCA):  +   Circumduction test:     -  [facility] test:     -   Log roll:       -   Snapping hip (internal):    -   Sit-up pain:      NT  Resisted sit-up pain:     NT  Resisted sit-up with adductor contraction pain:  NT  Step-down test:     NT  Trendelenburg test:     NT  Bridge test      NT    Extremity Neuro-vascular Examination:   Sensation:  Grossly intact to light touch all dermatomal regions.   Motor Function:  Fully intact motor function at hip, knee, foot and ankle    DTRs;  quadriceps and  achilles 2+.  No clonus and downgoing Babinski.    Vascular status:  DP and PT pulses 2+, brisk capillary refill, symmetric.    Skin:  intact, compartments soft.    Other Findings:    ASSESSMENT & PLAN  Assessment  #1 Tonnis Grade II osteoarthritis of hip, left  W/ acet labr tearing    No evidence of neurologic pathology  No evidence of vascular pathology    Imaging studies reviewed:  X-ray pelvis and hip, bilat OSH printed on paper, no read  MRI hip left OSH images on disc, no read    Plan    We discussed options including    Watchful waiting / relative rest    Physical therapy x   Injection therapy CSI iaHip left   Consultation    The patient chooses As above   x = prescribed  CSI = corticosteroid injection  VSI = viscosupplement injection  PRPI = platelet rich plasma injection  ia = intra articular  R = right  L = left  B = bilateral   nfSx = surgical consultation was recommended, but patient is not interested in consultation at this  time    Physical Therapy        Formal (fPT), @ [facility] facility P - [place]   Formal (fPT), @ [facility] facility B - [place]       Homegoing (hgPT), per concurrent fPT recommendations    Homegoing (hgPT), per prior fPT recommendations    Homegoing (hgPT), handout provided        w/  (atPT)    [blank] = not prescribed  x = prescribed  b = prescribed, and begin as indicated  t = continue as indicated  r = prescribed, and restart as indicated  p = completed prior as indicated  hs = prescribed, and with high school   col = prescribed, and with college or university   nfPT = physical therapy was recommended, but patient is not interested in PT at this time    Activity (e.g. sports, work) restrictions    [blank] = as tolerated  pt = per physical therapist  at = per   NWB = non weight bearing on affected lower extremity, with crutches assistance for ambulation    Bracing    [blank] = not prescribed  r = recommended, but not fit with at todays visit  f = prescribed and fit with at todays visit  t = continue as indicated  d = d/c  p = as needed  rare = use on rare, as-needed basis; advised against chronic use    Pain management    [blank] = No prescription necessary. A handout detailing dosing of appropriate   over-the-counter musculoskeletal analgesics was made available to the patient.   m = meloxicam x 14 days  mp = 14 day course of meloxicam prescribed prior    Follow up 10 days via [facility]   [blank] = as needed  [number] = in [number] weeks  CSI = for corticosteroid injection  VSI = for viscosupplement injection or injection series  PRP = for platelet rich plasma injection or injection series  MRI = after MRI imaging  ns = should surgical options be deferred (no surgery)  o = appointment offered, deferred by patient    Should symptoms worsen or fail to resolve, consider    Revisiting the above options and / or Consultation w/ [facility]     Vocation:

## 2025-03-20 ENCOUNTER — OFFICE VISIT (OUTPATIENT)
Dept: SPORTS MEDICINE | Facility: CLINIC | Age: 40
End: 2025-03-20
Payer: COMMERCIAL

## 2025-03-20 VITALS
DIASTOLIC BLOOD PRESSURE: 79 MMHG | SYSTOLIC BLOOD PRESSURE: 123 MMHG | WEIGHT: 180.31 LBS | HEIGHT: 69 IN | HEART RATE: 80 BPM | BODY MASS INDEX: 26.71 KG/M2

## 2025-03-20 DIAGNOSIS — M25.552 LEFT HIP PAIN: Primary | ICD-10-CM

## 2025-03-20 DIAGNOSIS — M16.12 PRIMARY OSTEOARTHRITIS OF LEFT HIP: ICD-10-CM

## 2025-03-20 DIAGNOSIS — S73.192S ACETABULAR LABRUM TEAR, LEFT, SEQUELA: ICD-10-CM

## 2025-03-20 PROCEDURE — 99999 PR PBB SHADOW E&M-EST. PATIENT-LVL III: CPT | Mod: PBBFAC,,, | Performed by: FAMILY MEDICINE

## 2025-03-20 RX ORDER — TRIAMCINOLONE ACETONIDE 40 MG/ML
40 INJECTION, SUSPENSION INTRA-ARTICULAR; INTRAMUSCULAR
Status: DISCONTINUED | OUTPATIENT
Start: 2025-03-20 | End: 2025-03-20 | Stop reason: HOSPADM

## 2025-03-20 RX ADMIN — TRIAMCINOLONE ACETONIDE 40 MG: 40 INJECTION, SUSPENSION INTRA-ARTICULAR; INTRAMUSCULAR at 01:03

## 2025-03-20 NOTE — PROCEDURES
"Large Joint Aspiration/Injection: L hip joint    Date/Time: 3/20/2025 1:30 PM    Performed by: Fito Damian MD  Authorized by: Fito Damian MD    Consent Done?:  Yes (Verbal)  Indications:  Pain  Site marked: the procedure site was marked    Timeout: prior to procedure the correct patient, procedure, and site was verified    Prep: patient was prepped and draped in usual sterile fashion    Local anesthesia used?: No      Details:  Needle Size:  22 G  Ultrasonic Guidance for needle placement?: Yes    Images are saved and documented.  Approach:  Anterior  Location:  Hip  Site:  L hip joint  Medications:  40 mg triamcinolone acetonide 40 mg/mL  Patient tolerance:  Patient tolerated the procedure well with no immediate complications     Description of ultrasound utilization for needle guidance:   Ultrasound guidance used for needle localization. Images saved and stored for documentation. The hip joint was visualized. Dynamic visualization of the 22g x 3.5" needle was continuous throughout the procedure.    Precautionary:  The patient's femoral artery, vein, and nerve were identified, and visualized throughout the procedure, so as to avoid needle contact with those structures.     "

## 2025-03-21 ENCOUNTER — TELEPHONE (OUTPATIENT)
Dept: SPORTS MEDICINE | Facility: CLINIC | Age: 40
End: 2025-03-21
Payer: COMMERCIAL

## 2025-03-21 DIAGNOSIS — M16.12 PRIMARY OSTEOARTHRITIS OF LEFT HIP: ICD-10-CM

## 2025-03-21 DIAGNOSIS — M25.552 LEFT HIP PAIN: Primary | ICD-10-CM

## 2025-03-21 DIAGNOSIS — S73.192S ACETABULAR LABRUM TEAR, LEFT, SEQUELA: ICD-10-CM

## 2025-03-21 NOTE — TELEPHONE ENCOUNTER
Received text message from Yeni at [facility], requesting PT order be faxed.     ==  Faxed external PT order to [facility] - [facility] - [phone]. Confirmation received.

## 2025-07-22 ENCOUNTER — PATIENT MESSAGE (OUTPATIENT)
Dept: SPORTS MEDICINE | Facility: CLINIC | Age: 40
End: 2025-07-22
Payer: COMMERCIAL

## 2025-07-22 ENCOUNTER — TELEPHONE (OUTPATIENT)
Dept: SPORTS MEDICINE | Facility: CLINIC | Age: 40
End: 2025-07-22
Payer: COMMERCIAL

## 2025-07-22 NOTE — TELEPHONE ENCOUNTER
25  Returned patient's call regarding imaging request. He clarified he brought in a disc with imaging on it to his appointment on 3/20 and was wanting it back. Advised him I will check with staff if it is still kept in clinic and get back to him on the patient portal messaging. He acknowledged this and was ok with this course of action.    Satish Dykes MS, OTC  Clinical Assistant to Dr. Fito Damian MD  [facility] [place]      Copied from CRM #[id number]. Topic: General Inquiry - Patient Advice  >> 2025 12:39 PM Jennifer wrote:  Pt is requesting a call from someone in the office and is asking for a return call back soon. Thanks.     Reason for call: REQUESTING IMAGES  from 3/20      Patient's DX:     Patient requesting call back or [facility] [phone]